# Patient Record
Sex: FEMALE | Race: OTHER | Employment: UNEMPLOYED | URBAN - METROPOLITAN AREA
[De-identification: names, ages, dates, MRNs, and addresses within clinical notes are randomized per-mention and may not be internally consistent; named-entity substitution may affect disease eponyms.]

---

## 2017-10-12 ENCOUNTER — HOSPITAL ENCOUNTER (EMERGENCY)
Age: 37
Discharge: HOME | End: 2017-10-12
Attending: EMERGENCY MEDICINE | Admitting: EMERGENCY MEDICINE
Payer: SUBSIDIZED

## 2017-10-12 ENCOUNTER — APPOINTMENT (OUTPATIENT)
Dept: GENERAL RADIOLOGY | Age: 37
End: 2017-10-12
Attending: PHYSICIAN ASSISTANT
Payer: SUBSIDIZED

## 2017-10-12 ENCOUNTER — HOSPITAL ENCOUNTER (EMERGENCY)
Age: 37
Discharge: HOME | End: 2017-10-13
Attending: EMERGENCY MEDICINE
Payer: SUBSIDIZED

## 2017-10-12 VITALS
BODY MASS INDEX: 21.69 KG/M2 | DIASTOLIC BLOOD PRESSURE: 82 MMHG | TEMPERATURE: 97.7 F | RESPIRATION RATE: 16 BRPM | WEIGHT: 122.4 LBS | HEART RATE: 65 BPM | OXYGEN SATURATION: 97 % | SYSTOLIC BLOOD PRESSURE: 125 MMHG | HEIGHT: 63 IN

## 2017-10-12 DIAGNOSIS — R07.89 ATYPICAL CHEST PAIN: Primary | ICD-10-CM

## 2017-10-12 DIAGNOSIS — M54.50 LOW BACK PAIN WITHOUT SCIATICA, UNSPECIFIED BACK PAIN LATERALITY, UNSPECIFIED CHRONICITY: Primary | ICD-10-CM

## 2017-10-12 DIAGNOSIS — N39.0 URINARY TRACT INFECTION WITHOUT HEMATURIA, SITE UNSPECIFIED: ICD-10-CM

## 2017-10-12 DIAGNOSIS — R09.1 PLEURISY: ICD-10-CM

## 2017-10-12 DIAGNOSIS — K59.00 CONSTIPATION, UNSPECIFIED CONSTIPATION TYPE: ICD-10-CM

## 2017-10-12 LAB
APPEARANCE UR: CLEAR
BACTERIA URNS QL MICRO: NEGATIVE /HPF
BILIRUB UR QL: NEGATIVE
COLOR UR: ABNORMAL
EPITH CASTS URNS QL MICRO: ABNORMAL /LPF
GLUCOSE UR STRIP.AUTO-MCNC: NEGATIVE MG/DL
HGB UR QL STRIP: NEGATIVE
KETONES UR QL STRIP.AUTO: NEGATIVE MG/DL
LEUKOCYTE ESTERASE UR QL STRIP.AUTO: ABNORMAL
MUCOUS THREADS URNS QL MICRO: ABNORMAL /LPF
NITRITE UR QL STRIP.AUTO: NEGATIVE
PH UR STRIP: 6 [PH] (ref 5–8)
PROT UR STRIP-MCNC: NEGATIVE MG/DL
RBC #/AREA URNS HPF: ABNORMAL /HPF (ref 0–5)
SP GR UR REFRACTOMETRY: 1.03 (ref 1–1.03)
UR CULT HOLD, URHOLD: NORMAL
UROBILINOGEN UR QL STRIP.AUTO: 1 EU/DL (ref 0.2–1)
WBC URNS QL MICRO: ABNORMAL /HPF (ref 0–4)

## 2017-10-12 PROCEDURE — 99284 EMERGENCY DEPT VISIT MOD MDM: CPT

## 2017-10-12 PROCEDURE — 82550 ASSAY OF CK (CPK): CPT | Performed by: EMERGENCY MEDICINE

## 2017-10-12 PROCEDURE — 36415 COLL VENOUS BLD VENIPUNCTURE: CPT | Performed by: EMERGENCY MEDICINE

## 2017-10-12 PROCEDURE — 74000 XR ABD (KUB): CPT

## 2017-10-12 PROCEDURE — 83690 ASSAY OF LIPASE: CPT | Performed by: EMERGENCY MEDICINE

## 2017-10-12 PROCEDURE — 74011250637 HC RX REV CODE- 250/637: Performed by: PHYSICIAN ASSISTANT

## 2017-10-12 PROCEDURE — 96361 HYDRATE IV INFUSION ADD-ON: CPT

## 2017-10-12 PROCEDURE — 85379 FIBRIN DEGRADATION QUANT: CPT | Performed by: EMERGENCY MEDICINE

## 2017-10-12 PROCEDURE — 72100 X-RAY EXAM L-S SPINE 2/3 VWS: CPT

## 2017-10-12 PROCEDURE — 96374 THER/PROPH/DIAG INJ IV PUSH: CPT

## 2017-10-12 PROCEDURE — 84484 ASSAY OF TROPONIN QUANT: CPT | Performed by: EMERGENCY MEDICINE

## 2017-10-12 PROCEDURE — 85025 COMPLETE CBC W/AUTO DIFF WBC: CPT | Performed by: EMERGENCY MEDICINE

## 2017-10-12 PROCEDURE — 81001 URINALYSIS AUTO W/SCOPE: CPT | Performed by: EMERGENCY MEDICINE

## 2017-10-12 PROCEDURE — 99283 EMERGENCY DEPT VISIT LOW MDM: CPT

## 2017-10-12 PROCEDURE — 80053 COMPREHEN METABOLIC PANEL: CPT | Performed by: EMERGENCY MEDICINE

## 2017-10-12 PROCEDURE — 93005 ELECTROCARDIOGRAM TRACING: CPT

## 2017-10-12 RX ORDER — CEPHALEXIN 500 MG/1
500 CAPSULE ORAL 2 TIMES DAILY
Qty: 14 CAP | Refills: 0 | Status: SHIPPED | OUTPATIENT
Start: 2017-10-12 | End: 2017-10-19

## 2017-10-12 RX ORDER — PHENAZOPYRIDINE HYDROCHLORIDE 200 MG/1
200 TABLET, FILM COATED ORAL 3 TIMES DAILY
Qty: 6 TAB | Refills: 0 | Status: SHIPPED | OUTPATIENT
Start: 2017-10-12 | End: 2017-10-14

## 2017-10-12 RX ORDER — IBUPROFEN 600 MG/1
600 TABLET ORAL
Qty: 20 TAB | Refills: 0 | OUTPATIENT
Start: 2017-10-12 | End: 2020-11-06

## 2017-10-12 RX ORDER — KETOROLAC TROMETHAMINE 30 MG/ML
30 INJECTION, SOLUTION INTRAMUSCULAR; INTRAVENOUS
Status: COMPLETED | OUTPATIENT
Start: 2017-10-12 | End: 2017-10-13

## 2017-10-12 RX ORDER — HYDROCODONE BITARTRATE AND ACETAMINOPHEN 5; 325 MG/1; MG/1
1 TABLET ORAL
Status: COMPLETED | OUTPATIENT
Start: 2017-10-12 | End: 2017-10-12

## 2017-10-12 RX ORDER — POLYETHYLENE GLYCOL 3350 17 G/17G
17 POWDER, FOR SOLUTION ORAL DAILY
Qty: 14 PACKET | Refills: 0 | Status: SHIPPED | OUTPATIENT
Start: 2017-10-12 | End: 2021-01-26 | Stop reason: ALTCHOICE

## 2017-10-12 RX ADMIN — HYDROCODONE BITARTRATE AND ACETAMINOPHEN 1 TABLET: 5; 325 TABLET ORAL at 18:24

## 2017-10-12 NOTE — DISCHARGE INSTRUCTIONS
Dolor de espalda: Instrucciones de cuidado - [ Back Pain: Care Instructions ]  Instrucciones de cuidado    El dolor de espalda tiene muchas causas posibles. Con frecuencia, está relacionado con problemas en los músculos y ligamentos de la espalda. También podría estar relacionado con problemas de los nervios, discos o huesos de la espalda. Moverse, levantar algo, ponerse de pie, sentarse o dormir de [place] incómoda pueden forzar la espalda. Algunas veces no se da cuenta de que tiene miryam lesión [employer] tarde. La artritis es otra causa común del dolor de espalda. Aunque dominique vez duela mucho, el dolor de espalda por lo general mejora por sí mismo en varias semanas. [address] personas se recuperan en 12 semanas o menos. Hacer un buen tratamiento en el hogar y tener cuidado de no forzar la espalda puede ayudar a que se sienta mejor más pronto. La atención de seguimiento es miryam parte clave de rowell tratamiento y seguridad. Asegúrese de hacer y acudir a todas las citas, y llame a rowell médico si está teniendo problemas. También es miryam buena idea saber los resultados de los exámenes y mantener miryam lista de los medicamentos que paresh. ¿Cómo puede cuidarse en el hogar? · Siéntese o acuéstese en las posiciones más cómodas y que reduzcan el dolor. Trate miryam de estas posiciones al acostarse:  ¨ Acuéstese boca arriba con las rodillas dobladas y apoyadas sobre almohadones grandes. ¨ Acuéstese en el piso con las piernas sobre el asiento de un sofá o miryam silla. ¨ Acuéstese de lado con las rodillas y caderas dobladas y [place] entre las piernas. ¨ Acuéstese boca abajo siempre que esta posición no empeore el dolor. · No se siente en la cama y evite los sofás blandos y las posiciones torcidas. El reposo en cama puede aliviar el dolor al principio, dimitris retrasa la sanación. Evite reposar en la cama después del primer día de dolor de espalda. · Cambie de posición cada 30 minutos.  Si debe sentarse por [employer], párese y descanse de estar sentado. Levántese y camine, o acuéstese en miryam posición cómoda. · Pruebe usar miryam almohadilla térmica a temperatura baja o mediana minerva 15 a 20 minutos cada 2 ó 3 horas. Pruebe miryam ducha tibia en vez de miryam sesión con la almohadilla térmica. · También puede probar miryam compresa de hielo minerva 10 a 15 minutos cada 2 a 3 horas. Póngase un paño soto entre la compresa de hielo y la piel. · [employer] analgésicos (medicamentos para el dolor) exactamente según las indicaciones. ¨ Si el médico le recetó un analgésico, tómelo según las indicaciones. ¨ Si no está tomando un analgésico recetado, pregúntele a rowell médico si puede rosalia thania de [employer]. · Aisha caminatas cortas varias veces al día. Usted puede comenzar con 5 a 10 minutos, 3 ó 4 veces al día, y aumentar progresivamente hasta lograr caminatas más largas. Camine en superficies yuan y evite colinas y escaleras hasta que la espalda esté mejor. · Regrese al Elois  y otras actividades lo más pronto posible. El descanso continuo sin actividad por lo general no es leary para la espalda. · Para prevenir el dolor de espalda en el futuro, aisha ejercicios para estirar y fortalecer la espalda y el abdomen. Aprenda a Time Jansen, técnicas seguras para levantar peso y la mecánica corporal apropiada. ¿Cuándo debe pedir ayuda? Llame a rowell médico ahora mismo o busque atención médica inmediata si:  · Tiene un nuevo entumecimiento en las piernas o éste empeora. · Tiene un nuevo debilitamiento en [place]. ([place] puede hacer que sea difícil ponerse de pie). · Pierde el control de la vejiga o los intestinos. Preste especial atención a los cambios en rowell natividad y asegúrese de comunicarse con rowell médico si:  · El dolor Stephanie [place]. · No está mejorando después de 2 semanas. ¿Dónde puede encontrar más información en inglés? Dede Cast a http://mayo-lukas.info/.   Escriba U259 en la búsqueda para aprender Blanca Gilliland de \"Dolor de espalda: Instrucciones de cuidado - [ Back Pain: Care Instructions ]. \"  Revisado: 21 marzo, 2017  Versión del contenido: 11.3  © 6726-5264 Healthwise, Incorporated. Las instrucciones de cuidado fueron adaptadas bajo licencia por Good Help Connections (which disclaims liability or warranty for this information). Si usted tiene [place] afección médica o sobre estas instrucciones, siempre pregunte a rowell profesional de natividad. Healthwise, Incorporated niega toda garantía o responsabilidad por rowell uso de esta información. Estreñimiento: Instrucciones de cuidado - [ Constipation: Care Instructions ]  Instrucciones de cuidado  Tener estreñimiento significa que usted tiene dificultades para eliminar las heces (evacuaciones del intestino). Las personas eliminan heces entre 3 veces al día y Anderson Huger vez cada 3 días. Lo que es normal para usted puede ser [employer]. El estreñimiento puede ocurrir con dolor en el recto y cólicos. El dolor podría empeorar cuando trata de eliminar las heces. A veces hay pequeñas cantidades de angie paulino viva en el papel higiénico o en la superficie de las heces. Amaya se debe a las venas dilatadas cerca del recto (hemorroides). Algunos cambios en rowell [place] y estilo de jimmy podrían ayudarle a evitar el estreñimiento continuo. Es posible que el médico además le recete medicamentos para ayudar a aflojar las heces. Algunos medicamentos pueden causar estreñimiento. Amaya incluye los analgésicos (medicamentos para el dolor) y los antidepresivos. Infórmele a rowell médico sobre Jose Richards que usted paresh. Es posible que rowell médico quiera cambiar un medicamento para aliviar alberto síntomas. La atención de seguimiento es miryam parte clave de rowell tratamiento y seguridad. Asegúrese de hacer y acudir a todas las citas, y llame a rowell médico si está teniendo problemas.  También es miryam buena idea saber los resultados de los exámenes y mantener miryam lista de [place] medicamentos que paresh. ¿Cómo puede cuidarse en el hogar? · Cadence abundantes líquidos, los suficientes ascencion para que rowell orina sea de color amarillo artie o transparente ascencion el agua. Si tiene [employer], del corazón o del hígado y tiene que Tyrone's líquidos, hable con rowell médico antes de aumentar rowell consumo. · Incluya en rowell dieta diaria alimentos ricos en fibra. Estos incluyen frutas, verduras, frijoles (habichuelas) y granos integrales. · [place] por lo menos 30 minutos de ejercicio la mayoría de los días de la [place]. Caminar es miryam buena opción. Es posible que también quiera hacer otras actividades, ascencion correr, nadar, [employer], o jugar al tenis u otros deportes de equipo. · [place] un suplemento de Deandra, ascencion Cit[employer] o Metamucil, todos los GRASSE. Vera y siga todas las indicaciones de la [place]. · Programe tiempo todos los días para evacuar el intestino. Daljit Inoue rutina diaria podría ayudar. Tómese rowell tiempo para evacuar el intestino. · Apoye los pies sobre un banco o taburete pequeño cuando se siente en el inodoro. [place] ayuda a flexionar las caderas y coloca la pelvis en posición de cuclillas. · Rowell médico podría recomendarle un laxante de venta aristeo para aliviar el estreñimiento. Fay Bar son Kalen Bird de Magnesia (Milk of Magnesia) y Romance. Vera y siga todas las instrucciones de la [place]. No use laxantes de [employer]. ¿Cuándo debe pedir ayuda? Llame a rowell médico ahora mismo o busque atención médica inmediata si:  · Tiene dolor abdominal nuevo o peor. · Tiene náuseas o vómito nuevos o peores. · Tiene angie en las heces. Preste especial atención a los cambios en rowell natividad y asegúrese de comunicarse con rowell médico si:  · Rowell estreñimiento empeora. · No mejora ascencion se esperaba. ¿Dónde puede encontrar más información en inglés? Brenda Wagoner a http://mayo-lukas.info/.   Escriba P343 en la búsqueda para aprender Ludger Shells de \"Estreñimiento: Instrucciones de cuidado - [ Constipation: Care Instructions ]. \"  Revisado: 20 marzo, 2017  Versión del contenido: 11.3  © 4764-4857 Healthwise, Incorporated. Las instrucciones de cuidado fueron adaptadas bajo licencia por Good Help Connections (which disclaims liability or warranty for this information). Si usted tiene [place] afección médica o sobre estas instrucciones, siempre pregunte a rowell profesional de natividad. Healthwise, Incorporated niega toda garantía o responsabilidad por rowell uso de esta información. Infección urinaria en las mujeres: Instrucciones de cuidado - [ Urinary Tract Infection in Women: Care Instructions ]  Instrucciones de cuidado    Miryam infección urinaria (UTI, por alberto siglas en inglés) es un término general que hace referencia a miryam infección que se produce en cualquier parte entre los riñones y la uretra (conducto por el cual se expulsa la orina). La mayoría de las UTI son infecciones de la vejiga. Con frecuencia, causan dolor o ardor al FrankiIrene. Las UTI son causadas por bacterias y pueden curarse con antibióticos. Asegúrese de completar el tratamiento para que la infección desaparezca. La atención de seguimiento es miryam parte clave de rowell tratamiento y seguridad. Asegúrese de hacer y acudir a todas las citas, y llame a rowell médico si está teniendo problemas. También es miryam buena idea saber los resultados de alberto exámenes y mantener miryam lista de los medicamentos que paresh. ¿Cómo puede cuidarse en el hogar? · [address] No deje de tomarlos por el hecho de sentirse mejor. Debe rosalia todos los antibióticos hasta terminarlos. · [place] los próximos thania o [address], flavio mayor cantidad de [place] y otros líquidos. [place] puede ayudar a eliminar las bacterias que provocan la infección. (Si tiene miryam enfermedad de los riñones, el corazón o el hígado y tiene que [place]'s líquidos, hable con rowell médico antes de aumentar rowell consumo).   · [address] gaseosas o con cafeína. Pueden irritar la vejiga. · Orine con frecuencia. Trate de vaciar la vejiga cada vez que orine. · Para aliviar el dolor, tome un baño caliente o colóquese miryam almohadilla térmica a baja temperatura sobre la parte baja del abdomen o la tutu genital. Nunca se duerma mientras [place] miryam almohadilla térmica. Para prevenir las infecciones urinarias  · Cadence abundante agua todos los días. [place] la ayuda a orinar con frecuencia, lo que elimina las bacterias de rowell organismo. (Si tiene miryam enfermedad de los riñones, el corazón o el hígado y tiene que Rajendra's líquidos, hable con rowell médico antes de aumentar rowell consumo). · Orine cuando necesite hacerlo. · Orine inmediatamente después de mauricio tenido [employer]. · Cámbiese las toallas sanitarias con frecuencia. · Evite el uso de lavados vaginales, los leonidas de burbujas, los [place] de higiene femenina y otros productos para la higiene femenina que contengan desodorantes. · Después de ir al baño, límpiese de adelante hacia atrás. ¿Cuándo debe pedir ayuda? Llame a rowell médico ahora mismo o busque atención médica inmediata si:  · Aparecen síntomas ascencion fiebre, escalofríos, náuseas o vómito por Radha Cabrera, o empeoran. · Tiene un nuevo dolor de espalda tonja debajo de las [place]. [place] se llama dolor en el flanco.  · Tiene angie o pus nuevos en la orina. · Tiene problemas con rowell antibiótico.  Preste especial atención a los cambios en rowell natividad y asegúrese de comunicarse con rowell médico si:  · No mejora después de mauricio tomado un antibiótico minerva 2 días. · Marry síntomas desaparecen y [employer]. ¿Dónde puede encontrar más información en inglés? Briana Valadez a http://mayo-lukas.info/. Delbert Rubio T979 en la búsqueda para aprender más acerca de \"Infección urinaria en las mujeres: Instrucciones de cuidado - [ Urinary Tract Infection in Women: Care Instructions ]. \"  Revisado: 28 noviembre, 2016  Versión del contenido: 11.3  © 1535-8590 Healthwise, [employer]ite.[employer]. Las instrucciones de cuidado fueron adaptadas bajo licencia por [facility] Connections (which disclaims liability or warranty for this information). Si usted tiene [place] afección médica o sobre estas instrucciones, siempre pregunte a rowell profesional de natividad. [place] [employer], [employer] niega toda garantía o responsabilidad por rowell uso de esta información.

## 2017-10-12 NOTE — ED TRIAGE NOTES
TRIAGE NOTE: Interpretor # [id number] \"My lower back hurts a lot. \" Symptoms started Saturday, stating that the pain goes from her lower back all the way up to her neck.  Denies injury, also reports dysuria and pain with defecation

## 2017-10-12 NOTE — ED PROVIDER NOTES
HPI Comments: 29 y/o  female presents to the ED for the evaluation of multiple medical complaints. According to patient, her symptoms started on Saturday. She said she started with some lower back discomfort. Worse with movement. Occasionally pain radiates down towards leg. She states it is worse with movement. She cannot recall any injury. She Is also c/o dysuria and urinary frequency. No vaginal discharge/bleeding. No saddle anesthesia noted. No bowel/bladder incontinence or urinary retention noted. She also noted feeling some constipation and pain with bowel movements due to it coming out hard. She denies any abdominal pain. No fevers/chills. No nausea/vomiting. No blood/mucous noted in stool. No other acute medical complaints expressed at this time. The history is provided by the patient. No  was used. History reviewed. No pertinent past medical history. History reviewed. No pertinent surgical history. History reviewed. No pertinent family history. Social History     Social History    Marital status: SINGLE     Spouse name: N/A    Number of children: N/A    Years of education: N/A     Occupational History    Not on file. Social History Main Topics    Smoking status: Never Smoker    Smokeless tobacco: Not on file    Alcohol use No    Drug use: Not on file    Sexual activity: Not on file     Other Topics Concern    Not on file     Social History Narrative         ALLERGIES: Review of patient's allergies indicates no known allergies. Review of Systems   Constitutional: Negative for chills and fever. HENT: Negative for ear pain, facial swelling, hearing loss, sore throat and trouble swallowing. Eyes: Negative. Respiratory: Negative for cough, chest tightness, shortness of breath and wheezing. Cardiovascular: Negative for chest pain. Gastrointestinal: Positive for constipation.  Negative for abdominal pain, diarrhea, nausea and vomiting. Genitourinary: Positive for dysuria. Negative for flank pain, frequency, hematuria and urgency. Musculoskeletal: Positive for back pain. Negative for neck pain and neck stiffness. Skin: Negative. Negative for rash and wound. Neurological: Negative for dizziness, weakness, light-headedness, numbness and headaches. Hematological: Does not bruise/bleed easily. Psychiatric/Behavioral: Negative. All other systems reviewed and are negative. Vitals:    10/12/17 1646   BP: 125/82   Pulse: 65   Resp: 16   Temp: 97.7 °F (36.5 °C)   SpO2: 97%   Weight: 55.5 kg (122 lb 6.4 oz)   Height: 5' 3\" (1.6 m)            Physical Exam   Constitutional: She is oriented to person, place, and time. She appears well-developed and well-nourished. No distress. HENT:   Head: Normocephalic and atraumatic. Eyes: Conjunctivae and EOM are normal. Pupils are equal, round, and reactive to light. Neck: Normal range of motion. Neck supple. No midline tenderness to palpation of cspine. Cardiovascular: Normal rate, regular rhythm, normal heart sounds and intact distal pulses. Exam reveals no gallop and no friction rub. No murmur heard. Pulmonary/Chest: Effort normal and breath sounds normal. No respiratory distress. She has no wheezes. She has no rales. She exhibits no tenderness. Abdominal: Soft. Bowel sounds are normal. She exhibits no distension and no mass. There is no tenderness. There is no rebound and no guarding. No aortic bruits,  No femoral bruits. 2+ femoral pulses     Musculoskeletal: Normal range of motion. She exhibits no edema or tenderness. No TS spine pain with palpation. Mild Lspine pain with palpation. No stepoffs, no deformity  No redness, rashes, warmth, or cellulitis. 5/5 flexion/extension of hips bilaterally  5/5 great toes strength bilaterally  5/5 dorsiflexion/plantarflexion bilaterally  Straight leg raise negative. No saddle anesthesia present.   Walks on heels/toes. Neurological: She is alert and oriented to person, place, and time. She has normal reflexes. No cranial nerve deficit. She exhibits normal muscle tone. Coordination normal.   Skin: Skin is warm and dry. No rash noted. No erythema. Psychiatric: She has a normal mood and affect. Her behavior is normal. Judgment and thought content normal.   Nursing note and vitals reviewed.        MDM  ED Course       Procedures    29 y/o female with low back pain, suspect msk in nature as tender to palpation  Will d/c home with motrin and recommend heating pad  Patient also with + uti so will cover with keflex and pyridium  Patient also with likely constipation so will recommend daily miralax   Patient verbalizes understanding of dx and is aware of what s/sx to monitor that would warrant return visit to ED  Discussed with Dr. Kunal Romero

## 2017-10-13 ENCOUNTER — APPOINTMENT (OUTPATIENT)
Dept: GENERAL RADIOLOGY | Age: 37
End: 2017-10-13
Attending: EMERGENCY MEDICINE
Payer: SUBSIDIZED

## 2017-10-13 VITALS
DIASTOLIC BLOOD PRESSURE: 66 MMHG | WEIGHT: 121.69 LBS | RESPIRATION RATE: 16 BRPM | SYSTOLIC BLOOD PRESSURE: 106 MMHG | OXYGEN SATURATION: 99 % | HEIGHT: 63 IN | TEMPERATURE: 97.8 F | HEART RATE: 68 BPM | BODY MASS INDEX: 21.56 KG/M2

## 2017-10-13 LAB
ALBUMIN SERPL-MCNC: 3.8 G/DL (ref 3.5–5)
ALBUMIN/GLOB SERPL: 0.9 {RATIO} (ref 1.1–2.2)
ALP SERPL-CCNC: 85 U/L (ref 45–117)
ALT SERPL-CCNC: 40 U/L (ref 12–78)
ANION GAP BLD CALC-SCNC: 16 MMOL/L (ref 5–15)
ANION GAP SERPL CALC-SCNC: 9 MMOL/L (ref 5–15)
AST SERPL-CCNC: 64 U/L (ref 15–37)
BASOPHILS # BLD: 0 K/UL (ref 0–0.1)
BASOPHILS NFR BLD: 0 % (ref 0–1)
BILIRUB SERPL-MCNC: 0.7 MG/DL (ref 0.2–1)
BUN BLD-MCNC: 13 MG/DL (ref 9–20)
BUN SERPL-MCNC: 16 MG/DL (ref 6–20)
BUN/CREAT SERPL: 24 (ref 12–20)
CA-I BLD-MCNC: 1.2 MMOL/L (ref 1.12–1.32)
CALCIUM SERPL-MCNC: 9.3 MG/DL (ref 8.5–10.1)
CHLORIDE BLD-SCNC: 107 MMOL/L (ref 98–107)
CHLORIDE SERPL-SCNC: 105 MMOL/L (ref 97–108)
CK MB CFR SERPL CALC: NORMAL % (ref 0–2.5)
CK MB SERPL-MCNC: <1 NG/ML (ref 5–25)
CK SERPL-CCNC: 166 U/L (ref 26–192)
CO2 BLD-SCNC: 24 MMOL/L (ref 21–32)
CO2 SERPL-SCNC: 23 MMOL/L (ref 21–32)
CREAT BLD-MCNC: 0.5 MG/DL (ref 0.6–1.3)
CREAT SERPL-MCNC: 0.66 MG/DL (ref 0.55–1.02)
D DIMER PPP FEU-MCNC: 0.17 MG/L FEU (ref 0–0.65)
EOSINOPHIL # BLD: 0 K/UL (ref 0–0.4)
EOSINOPHIL NFR BLD: 0 % (ref 0–7)
ERYTHROCYTE [DISTWIDTH] IN BLOOD BY AUTOMATED COUNT: 13 % (ref 11.5–14.5)
GLOBULIN SER CALC-MCNC: 4.4 G/DL (ref 2–4)
GLUCOSE BLD-MCNC: 142 MG/DL (ref 65–100)
GLUCOSE SERPL-MCNC: 113 MG/DL (ref 65–100)
HCT VFR BLD AUTO: 39.3 % (ref 35–47)
HCT VFR BLD CALC: 38 % (ref 35–47)
HGB BLD-MCNC: 12.9 GM/DL (ref 11.5–16)
HGB BLD-MCNC: 13.2 G/DL (ref 11.5–16)
LIPASE SERPL-CCNC: 127 U/L (ref 73–393)
LYMPHOCYTES # BLD: 1.4 K/UL (ref 0.8–3.5)
LYMPHOCYTES NFR BLD: 10 % (ref 12–49)
MCH RBC QN AUTO: 29.9 PG (ref 26–34)
MCHC RBC AUTO-ENTMCNC: 33.6 G/DL (ref 30–36.5)
MCV RBC AUTO: 88.9 FL (ref 80–99)
MONOCYTES # BLD: 0.9 K/UL (ref 0–1)
MONOCYTES NFR BLD: 6 % (ref 5–13)
NEUTS SEG # BLD: 12.2 K/UL (ref 1.8–8)
NEUTS SEG NFR BLD: 84 % (ref 32–75)
PLATELET # BLD AUTO: 279 K/UL (ref 150–400)
POTASSIUM BLD-SCNC: 3.8 MMOL/L (ref 3.5–5.1)
POTASSIUM SERPL-SCNC: 5.6 MMOL/L (ref 3.5–5.1)
PROT SERPL-MCNC: 8.2 G/DL (ref 6.4–8.2)
RBC # BLD AUTO: 4.42 M/UL (ref 3.8–5.2)
SERVICE CMNT-IMP: ABNORMAL
SODIUM BLD-SCNC: 143 MMOL/L (ref 136–145)
SODIUM SERPL-SCNC: 137 MMOL/L (ref 136–145)
TROPONIN I SERPL-MCNC: <0.04 NG/ML
WBC # BLD AUTO: 14.6 K/UL (ref 3.6–11)

## 2017-10-13 PROCEDURE — 71020 XR CHEST PA LAT: CPT

## 2017-10-13 PROCEDURE — 74011250636 HC RX REV CODE- 250/636: Performed by: EMERGENCY MEDICINE

## 2017-10-13 PROCEDURE — 80047 BASIC METABLC PNL IONIZED CA: CPT

## 2017-10-13 RX ORDER — HYDROCODONE BITARTRATE AND ACETAMINOPHEN 5; 325 MG/1; MG/1
1 TABLET ORAL
Qty: 20 TAB | Refills: 0 | Status: SHIPPED | OUTPATIENT
Start: 2017-10-13 | End: 2021-01-26 | Stop reason: ALTCHOICE

## 2017-10-13 RX ADMIN — KETOROLAC TROMETHAMINE 30 MG: 30 INJECTION, SOLUTION INTRAMUSCULAR at 00:11

## 2017-10-13 RX ADMIN — SODIUM CHLORIDE 1000 ML: 900 INJECTION, SOLUTION INTRAVENOUS at 00:12

## 2017-10-13 NOTE — ED PROVIDER NOTES
HPI Comments: 30 y.o. female with no significant past medical history who presents from home with chief complaint of chest pain. Pt reports an acute onset of substernal chest pain x shortly prior to arrival. The pain has been constant, radiating to epigastric region. Pt provides paperwork from ED visit earlier in the day for back pain (mid to low). She states that she is still having this pain as well. Pt denies nausea, vomiting, diarrhea, constipation, headache, arm pain or leg pain. There are no other acute medical concerns at this time. Pt is currently breast feeding. Chart review: Pt evaluated in ED 6.5 hours ago for Low back pain without sciatica, UTI and constipation. Pt discharged home on Keflex, Ibuprofen, Pyridum and Miralax. PCP: None    Note written by Tricia Mena, as dictated by Sarwat Mckay MD 12:10 AM    The history is provided by the patient. No  was used. History reviewed. No pertinent past medical history. History reviewed. No pertinent surgical history. History reviewed. No pertinent family history. Social History     Social History    Marital status: SINGLE     Spouse name: N/A    Number of children: N/A    Years of education: N/A     Occupational History    Not on file. Social History Main Topics    Smoking status: Never Smoker    Smokeless tobacco: Not on file    Alcohol use No    Drug use: Not on file    Sexual activity: Not on file     Other Topics Concern    Not on file     Social History Narrative         ALLERGIES: Review of patient's allergies indicates no known allergies. Review of Systems   Cardiovascular: Positive for chest pain. Negative for leg swelling. Gastrointestinal: Positive for abdominal pain (epigastric). Negative for constipation, diarrhea, nausea and vomiting. Musculoskeletal: Positive for back pain. Negative for neck pain. Skin: Negative for rash. Neurological: Negative for headaches. All other systems reviewed and are negative. Vitals:    10/12/17 2308   BP: 129/86   Pulse: (!) 59   Resp: 16   Temp: 97.5 °F (36.4 °C)   SpO2: 99%   Weight: 55.2 kg (121 lb 11.1 oz)   Height: 5' 3\" (1.6 m)            Physical Exam   Nursing note and vitals reviewed. CONSTITUTIONAL: Well-appearing; well-nourished; in no apparent distress  HEAD: Normocephalic; atraumatic  EYES: PERRL; EOM intact; conjunctiva and sclera are clear bilaterally. ENT: No rhinorrhea; normal pharynx with no tonsillar hypertrophy; mucous membranes pink/moist, no erythema, no exudate. NECK: Supple; non-tender; no cervical lymphadenopathy  CARD: Normal S1, S2; no murmurs, rubs, or gallops. Regular rate and rhythm. RESP: Normal respiratory effort; breath sounds clear and equal bilaterally; no wheezes, rhonchi, or rales. Mid-sternal chest wall tenderness on palpation and movement of torso and both arms  ABD: Normal bowel sounds; non-distended; non-tender; no palpable organomegaly, no masses, no bruits. Back Exam: Normal inspection; no vertebral point tenderness, no CVA tenderness. Normal range of motion. EXT: Normal ROM in all four extremities; non-tender to palpation; no swelling or deformity; distal pulses are normal, no edema. SKIN: Warm; dry; no rash. NEURO:Alert and oriented x 3, coherent, JOHN-XII grossly intact, sensory and motor are non-focal.        MDM  Number of Diagnoses or Management Options  Atypical chest pain:   Pleurisy:   Diagnosis management comments: Assessment: 30 year female with pleuritic chest pain that is palpable reducible. On exam. Differential diagnosis includes ACS,VTE, pleurisy, pneumonia, pneumothorax, GERD, and myofascial strain. Plan: EKG/ chest x-ray/ lab/ Toradol/ serial exam/ Monitor and Reevaluate.          Amount and/or Complexity of Data Reviewed  Clinical lab tests: ordered and reviewed  Tests in the radiology section of CPT®: ordered and reviewed  Tests in the medicine section of CPT®: reviewed and ordered  Discussion of test results with the performing providers: yes  Decide to obtain previous medical records or to obtain history from someone other than the patient: yes  Obtain history from someone other than the patient: yes  Review and summarize past medical records: yes  Discuss the patient with other providers: yes  Independent visualization of images, tracings, or specimens: yes    Risk of Complications, Morbidity, and/or Mortality  Presenting problems: moderate  Diagnostic procedures: moderate  Management options: moderate      ED Course       Procedures     ED EKG interpretation:  Rhythm: sinus bradycardia; and regular . Rate (approx.): 59; Axis: normal; P wave: normal; QRS interval: normal ; ST/T wave: normal; in  Lead: Diffusely; Other findings: abnormal ekg. This EKG was interpreted by Gama Palomino MD,ED Provider. XRAY INTERPRETATION (ED MD)  Chest Xray  No acute process seen. Normal heart size. No bony abnormalities. No infiltrate. Gama Palomino MD 11:27 PM    Progress Note:   Pt has been reexamined by Gama Palomino MD. Pt is feeling much better. Symptoms have improved. All available results have been reviewed with pt and any available family. Spell he has potassium of 5.6, was repeated and appears not to be within normal limitsPt understands sx, dx, and tx in ED. Care plan has been outlined and questions have been answered. Pt is ready to go home. Will send home on chest pain, and pleurisy instructions. prescription of hydrocodone. Outpatient referral with PCP as needed. Written by Gama Palomino MD,6:17 AM    .   .

## 2017-10-13 NOTE — DISCHARGE INSTRUCTIONS
We hope that we have addressed all of your medical concerns. The examination and treatment you received in the Emergency Department were for an emergent problem and were not intended as complete care. It is important that you follow up with your healthcare provider(s) for ongoing care. If your symptoms worsen or do not improve as expected, and you are unable to reach your usual health care provider(s), you should return to the Emergency Department. Today's healthcare is undergoing tremendous change, and patient satisfaction surveys are one of the many tools to assess the quality of medical care. You may receive a survey from the [employer] organization regarding your experience in the Emergency Department. I hope that your experience has been completely positive, particularly the medical care that I provided. As such, please participate in the survey; anything less than excellent does not meet my expectations or intentions. [facility] and [facility] participate in nationally recognized quality of care measures. If your blood pressure is greater than 120/80, as reported below, we urge that you seek medical care to address the potential of high blood pressure, commonly known as hypertension. Hypertension can be hereditary or can be caused by certain medical conditions, pain, stress, or \"white coat syndrome. \"       Please make an appointment with your health care provider(s) for follow up of your Emergency Department visit. VITALS:   Patient Vitals for the past 8 hrs:   Temp Pulse Resp BP SpO2   10/13/17 0100 - - - 104/62 100 %   10/12/17 2308 97.5 °F (36.4 °C) (!) 59 16 129/86 99 %          Thank you for allowing us to provide you with medical care today. We realize that you have many choices for your emergency care needs. Please choose us in the future for any continued health care needs. Fernando Rodriguez, [address] Emergency [facility].   Office: [phone]            Recent Results (from the past 24 hour(s))   URINALYSIS W/ RFLX MICROSCOPIC    Collection Time: 10/12/17  6:02 PM   Result Value Ref Range    Color YELLOW/STRAW      Appearance CLEAR CLEAR      Specific gravity 1.029 1.003 - 1.030      pH (UA) 6.0 5.0 - 8.0      Protein NEGATIVE  NEG mg/dL    Glucose NEGATIVE  NEG mg/dL    Ketone NEGATIVE  NEG mg/dL    Bilirubin NEGATIVE  NEG      Blood NEGATIVE  NEG      Urobilinogen 1.0 0.2 - 1.0 EU/dL    Nitrites NEGATIVE  NEG      Leukocyte Esterase SMALL (A) NEG      WBC 5-10 0 - 4 /hpf    RBC 0-5 0 - 5 /hpf    Epithelial cells FEW FEW /lpf    Bacteria NEGATIVE  NEG /hpf    Mucus 2+ (A) NEG /lpf   URINE CULTURE HOLD SAMPLE    Collection Time: 10/12/17  6:02 PM   Result Value Ref Range    Urine culture hold URINE ON HOLD IN MICROBIOLOGY DEPT FOR 3 DAYS     EKG, 12 LEAD, INITIAL    Collection Time: 10/12/17 11:12 PM   Result Value Ref Range    Ventricular Rate 59 BPM    Atrial Rate 59 BPM    P-R Interval 144 ms    QRS Duration 84 ms    Q-T Interval 438 ms    QTC Calculation (Bezet) 433 ms    Calculated P Axis 46 degrees    Calculated R Axis 27 degrees    Calculated T Axis 39 degrees    Diagnosis Sinus bradycardia  No previous ECGs available      CBC WITH AUTOMATED DIFF    Collection Time: 10/12/17 11:55 PM   Result Value Ref Range    WBC 14.6 (H) 3.6 - 11.0 K/uL    RBC 4.42 3.80 - 5.20 M/uL    HGB 13.2 11.5 - 16.0 g/dL    HCT 39.3 35.0 - 47.0 %    MCV 88.9 80.0 - 99.0 FL    MCH 29.9 26.0 - 34.0 PG    MCHC 33.6 30.0 - 36.5 g/dL    RDW 13.0 11.5 - 14.5 %    PLATELET 984 200 - 392 K/uL    NEUTROPHILS 84 (H) 32 - 75 %    LYMPHOCYTES 10 (L) 12 - 49 %    MONOCYTES 6 5 - 13 %    EOSINOPHILS 0 0 - 7 %    BASOPHILS 0 0 - 1 %    ABS. NEUTROPHILS 12.2 (H) 1.8 - 8.0 K/UL    ABS. LYMPHOCYTES 1.4 0.8 - 3.5 K/UL    ABS. MONOCYTES 0.9 0.0 - 1.0 K/UL    ABS. EOSINOPHILS 0.0 0.0 - 0.4 K/UL    ABS.  BASOPHILS 0.0 0.0 - 0.1 K/UL   CK W/ CKMB & INDEX    Collection Time: 10/12/17 11:55 PM   Result Value Ref Range     26 - 192 U/L    CK - MB <1.0 <3.6 NG/ML    CK-MB Index Cannot be calculated 0 - 2.5     TROPONIN I    Collection Time: 10/12/17 11:55 PM   Result Value Ref Range    Troponin-I, Qt. <0.04 <3.29 ng/mL   METABOLIC PANEL, COMPREHENSIVE    Collection Time: 10/12/17 11:55 PM   Result Value Ref Range    Sodium 137 136 - 145 mmol/L    Potassium 5.6 (H) 3.5 - 5.1 mmol/L    Chloride 105 97 - 108 mmol/L    CO2 23 21 - 32 mmol/L    Anion gap 9 5 - 15 mmol/L    Glucose 113 (H) 65 - 100 mg/dL    BUN 16 6 - 20 MG/DL    Creatinine 0.66 0.55 - 1.02 MG/DL    BUN/Creatinine ratio 24 (H) 12 - 20      GFR est AA >60 >60 ml/min/1.73m2    GFR est non-AA >60 >60 ml/min/1.73m2    Calcium 9.3 8.5 - 10.1 MG/DL    Bilirubin, total 0.7 0.2 - 1.0 MG/DL    ALT (SGPT) 40 12 - 78 U/L    AST (SGOT) 64 (H) 15 - 37 U/L    Alk. phosphatase 85 45 - 117 U/L    Protein, total 8.2 6.4 - 8.2 g/dL    Albumin 3.8 3.5 - 5.0 g/dL    Globulin 4.4 (H) 2.0 - 4.0 g/dL    A-G Ratio 0.9 (L) 1.1 - 2.2     D DIMER    Collection Time: 10/12/17 11:55 PM   Result Value Ref Range    D-dimer 0.17 0.00 - 0.65 mg/L FEU   LIPASE    Collection Time: 10/12/17 11:55 PM   Result Value Ref Range    Lipase 127 73 - 393 U/L   POC CHEM8    Collection Time: 10/13/17  2:09 AM   Result Value Ref Range    Calcium, ionized (POC) 1.20 1. 12 - 1.32 MMOL/L    Sodium (POC) 143 136 - 145 MMOL/L    Potassium (POC) 3.8 3.5 - 5.1 MMOL/L    Chloride (POC) 107 98 - 107 MMOL/L    CO2 (POC) 24 21 - 32 MMOL/L    Anion gap (POC) 16 (H) 5 - 15 mmol/L    Glucose (POC) 142 (H) 65 - 100 MG/DL    BUN (POC) 13 9 - 20 MG/DL    Creatinine (POC) 0.5 (L) 0.6 - 1.3 MG/DL    GFRAA, POC >60 >60 ml/min/1.73m2    GFRNA, POC >60 >60 ml/min/1.73m2    Hemoglobin (POC) 12.9 11.5 - 16.0 GM/DL    Hematocrit (POC) 38 35.0 - 47.0 %    Comment Comment Not Indicated.          Xr Chest Pa Lat    Result Date: 10/13/2017  CLINICAL HISTORY: Chest pain INDICATION: Chest pain COMPARISON: None FINDINGS: PA and lateral views of the chest are obtained. The cardiopericardial silhouette is within normal limits. There is no pleural effusion, pneumothorax or focal consolidation present. IMPRESSION: No acute intrathoracic disease. Xr Spine Lumb 2 Or 3 V    Result Date: 10/12/2017  INDICATION:  Back Pain Exam: AP, lateral and cone-down views of the lumbar spine. FINDINGS: There is no acute fracture or subluxation. Intervertebral disc spaces are well maintained. Bones are well-mineralized. Soft tissues are normal.     IMPRESSION: No acute fracture or subluxation. Xr Abd (kub)    Result Date: 10/12/2017  INDICATION: Abdominal pain FINDINGS: Single supine view of the abdomen demonstrates a nonspecific intestinal gas pattern. There is a moderate amount of stool throughout the colon. Surgical clips are present in the right upper quadrant. Intrauterine device overlies the pelvis. No soft tissue mass or pathological soft tissue calcification is seen. The osseous structures are unremarkable. IMPRESSION: Nonspecific intestinal gas pattern. Moderate amount of stool throughout the colon. Pleuritis: Instrucciones de cuidado - [ Pleurisy: Care Instructions ]  Instrucciones de cuidado  La pleuritis es miryam inflamación de los tejidos que recubren la parte interna de las tim torácicas (del pecho) y los pulmones (pleura). Con frecuencia, la pleuritis es causada por miryam infección, generalmente un virus. También puede ser causada por otros problemas de natividad, ascencion neumonía o lupus. La pleuritis puede causar dolor shantelle en el pecho que empeora cuando usted tose o respira profundamente. Podría necesitar más pruebas para determinar qué está causando la pleuritis. El tratamiento depende de la causa. La pleuritis podría aparecer y desaparecer minerva algunos días, o podría permanecer si la causa no ha sido tratada.  El tratamiento en el hogar puede ayudarle a aliviar los síntomas. La atención de seguimiento es miryam parte clave de rowell tratamiento y seguridad. Asegúrese de hacer y acudir a todas las citas, y llame a rowell médico si está teniendo problemas. También es miryam buena idea saber los resultados de los exámenes y mantener miryam lista de los medicamentos que paresh. ¿Cómo puede cuidarse en el hogar? · Rotan un analgésico (medicamento para el dolor) de venta aristeo, ascencion acetaminofén (Tylenol), ibuprofeno (Advil, Motrin) o naproxeno (Aleve). Vera y siga todas las instrucciones de la [place]. · No tome dos o más analgésicos al mismo tiempo a menos que el médico se lo haya indicado. Muchos analgésicos contienen acetaminofén, es decir, Tylenol. El exceso de acetaminofén (Tylenol) puede ser dañino. · Si rowell médico le recetó antibióticos, tómelos según las indicaciones. No deje de tomarlos por el hecho de sentirse mejor. Debe rosalia todos los antibióticos hasta terminarlos. · Si rowell médico se lo recomienda, tome medicamento para la tos según las indicaciones. · Evite las actividades que empeoren rowell dolor. ¿Cuándo debe pedir ayuda? Llame al 911 en cualquier momento que considere que necesita atención de emergencia. Por ejemplo, llame si:  · [address] dificultades para respirar. · Tiene dolor intenso en el pecho. · Se desmayó (perdió el conocimiento). Llame a rowell médico ahora mismo o busque atención médica inmediata si:  · Vuelve a tener fiebre o [place]. Preste especial atención a los cambios en rowell natividad y asegúrese de comunicarse con rowell médico si:  · [place] a toser mucosidad de color amarillento o verdoso. · Tose angie. · Marry síntomas no mejoran en 3 ó 4 días. ¿Dónde puede encontrar más información en inglés? Franklin Bernabe a http://mayo-lukas.info/. Jose Wallace F346 en la búsqueda para aprender más acerca de \"Pleuritis: Instrucciones de cuidado - [ Pleurisy: Care Instructions ]. \"  Revisado: 25 Damas[address] Ala 2017  Versión del contenido: 11.3  © 9899-8803 Healthwise, Incorporated. Las instrucciones de cuidado fueron adaptadas bajo licencia por [employer] (which disclaims liability or warranty for this information). Si usted tiene [place] afección médica o sobre estas instrucciones, siempre pregunte a rowell profesional de natividad. [employer] niega toda garantía o responsabilidad por rowell uso de esta información. Dolor de pecho: Instrucciones de cuidado - [ Chest Pain: Care Instructions ]  Instrucciones de cuidado  El dolor de pecho puede tener muchas causas. Algunas no son graves y mejorarán por sí solas en pocos días. Eri algunos tipos de dolor de pecho requieren más pruebas y [place]. Es posible que rowell médico le haya recomendado miryam visita de seguimiento dentro de las 8 a 12 horas siguientes. Si no mejora, es posible que necesite [address] pruebas o [place]. Aunque rowell médico le haya dado de isaac, es necesario que esté atento a cualquier problema que se presente. El médico le hizo un cuidadoso chequeo, eri a veces los problemas pueden aparecer posteriormente. Si tiene nuevos síntomas o éstos no mejoran, obtenga atención médica de inmediato. Si tiene dolor o presión en el pecho que empeora o es diferente y que dura más de 5 minutos, o se desmayó (perdió el conocimiento), llame al 911 o busque otra ayuda de emergencia de inmediato. Acudir a miryam consulta médica es sólo un paso en rowell tratamiento. Aunque se sienta mejor, todavía deberá hacer lo que rowell médico le recomiende, ascencion asistir a todas las visitas de seguimiento sugeridas y rosalia los medicamentos exactamente [employer]	[place] fueron indicados. Belva le ayudará a recuperarse y prevenir problemas futuros. ¿Cómo puede cuidarse en el hogar? · Descanse hasta que se sienta mejor. · Yeager alberto medicamentos exactamente ascencion le fueron recetados. Llame a rowell médico si ayla estar teniendo problemas con rowell medicamento.   · No conduzca después de rosalia un analgésico (medicamento para el dolor) recetado. ¿Cuándo debe pedir ayuda? Llame al 911 si:  · Se desmayó (perdió el conocimiento). · Tiene graves dificultades para respirar. · Tiene síntomas de un ataque al corazón. Estos podrían incluir:  ¨ Dolor o presión en el pecho, o miryam sensación extraña en el pecho. ¨ Sudoración. ¨ Falta de aire. ¨ Náuseas o vómito. ¨ Dolor, presión o miryam sensación extraña en la espalda, el lance, la mandíbula, la parte superior del abdomen o en thania o ambos hombros o brazos. ¨ Aturdimiento o debilidad repentina. ¨ Latidos del corazón rápidos o irregulares. Después de llamar al 911, es posible que el operador le diga que mastique 1 aspirina para adultos o de 2 a 4 aspirinas de dosis baja. Espere a miryam ambulancia. No intente conducir usted mismo. Llame hoy a rowell médico si:  · Tiene cualquier dificultad para respirar. · El dolor en el pecho empeora. · Siente mareos o aturdimiento, o que está a punto de [place]. · No mejora ascencion se esperaba. · Tiene dolor de pecho nuevo o diferente. ¿Dónde puede encontrar más información en inglés? Franklin Bernabe a http://mayo-lukas.info/. Escriba A120 en la búsqueda para aprender más acerca de \"Dolor de pecho: Instrucciones de cuidado - [ Chest Pain: Care Instructions ]. \"  Revisado: 20 marzo, 2017  Versión del contenido: 11.3  © 5629-6708 [employer]. Las instrucciones de cuidado fueron adaptadas bajo licencia por [employer] (which disclaims liability or warranty for this information). Si usted tiene [place] afección médica o sobre estas instrucciones, siempre pregunte a rowell profesional de natividad. [facility] niega toda garantía o responsabilidad por rowell uso de esta información.

## 2017-10-13 NOTE — ED TRIAGE NOTES
Triage Note: Pt arrives via EMS from home for sciatica pain. Pt was seen this afternoon here and discharged with dx of sciatica, UTI and constipation. Pt stated pain has come back. Filled prescription and took first dose of medication. Only change since this afternoon is pt now complaining of mid-chest pain.

## 2017-10-13 NOTE — ED NOTES
Patient given discharge instructions and verbalized understanding; PIV discontinued. Patient provided with list of clinics for follow up that speak Armenian, patient and family member verbalized understanding. Patient ambulatory out of department with steady gait.

## 2017-10-14 LAB
ATRIAL RATE: 59 BPM
CALCULATED P AXIS, ECG09: 46 DEGREES
CALCULATED R AXIS, ECG10: 27 DEGREES
CALCULATED T AXIS, ECG11: 39 DEGREES
DIAGNOSIS, 93000: NORMAL
P-R INTERVAL, ECG05: 144 MS
Q-T INTERVAL, ECG07: 438 MS
QRS DURATION, ECG06: 84 MS
QTC CALCULATION (BEZET), ECG08: 433 MS
VENTRICULAR RATE, ECG03: 59 BPM

## 2019-10-05 ENCOUNTER — HOSPITAL ENCOUNTER (EMERGENCY)
Age: 39
Discharge: HOME | End: 2019-10-06
Attending: EMERGENCY MEDICINE | Admitting: EMERGENCY MEDICINE
Payer: SUBSIDIZED

## 2019-10-05 ENCOUNTER — APPOINTMENT (OUTPATIENT)
Dept: GENERAL RADIOLOGY | Age: 39
End: 2019-10-05
Attending: PHYSICIAN ASSISTANT
Payer: SUBSIDIZED

## 2019-10-05 DIAGNOSIS — T78.40XA ALLERGIC REACTION, INITIAL ENCOUNTER: Primary | ICD-10-CM

## 2019-10-05 LAB
BASOPHILS # BLD: 0 K/UL (ref 0–0.1)
BASOPHILS NFR BLD: 0 % (ref 0–1)
DIFFERENTIAL METHOD BLD: NORMAL
EOSINOPHIL # BLD: 0.2 K/UL (ref 0–0.4)
EOSINOPHIL NFR BLD: 4 % (ref 0–7)
ERYTHROCYTE [DISTWIDTH] IN BLOOD BY AUTOMATED COUNT: 12.9 % (ref 11.5–14.5)
HCT VFR BLD AUTO: 39.9 % (ref 35–47)
HGB BLD-MCNC: 12.8 G/DL (ref 11.5–16)
IMM GRANULOCYTES # BLD AUTO: 0 K/UL (ref 0–0.04)
IMM GRANULOCYTES NFR BLD AUTO: 0 % (ref 0–0.5)
LYMPHOCYTES # BLD: 2.8 K/UL (ref 0.8–3.5)
LYMPHOCYTES NFR BLD: 40 % (ref 12–49)
MCH RBC QN AUTO: 29.7 PG (ref 26–34)
MCHC RBC AUTO-ENTMCNC: 32.1 G/DL (ref 30–36.5)
MCV RBC AUTO: 92.6 FL (ref 80–99)
MONOCYTES # BLD: 0.6 K/UL (ref 0–1)
MONOCYTES NFR BLD: 8 % (ref 5–13)
NEUTS SEG # BLD: 3.3 K/UL (ref 1.8–8)
NEUTS SEG NFR BLD: 48 % (ref 32–75)
NRBC # BLD: 0 K/UL (ref 0–0.01)
NRBC BLD-RTO: 0 PER 100 WBC
PLATELET # BLD AUTO: 243 K/UL (ref 150–400)
PMV BLD AUTO: 11 FL (ref 8.9–12.9)
RBC # BLD AUTO: 4.31 M/UL (ref 3.8–5.2)
TROPONIN I BLD-MCNC: <0.04 NG/ML (ref 0–0.08)
WBC # BLD AUTO: 7 K/UL (ref 3.6–11)

## 2019-10-05 PROCEDURE — 74011250637 HC RX REV CODE- 250/637: Performed by: PHYSICIAN ASSISTANT

## 2019-10-05 PROCEDURE — 85025 COMPLETE CBC W/AUTO DIFF WBC: CPT

## 2019-10-05 PROCEDURE — 96374 THER/PROPH/DIAG INJ IV PUSH: CPT

## 2019-10-05 PROCEDURE — 80053 COMPREHEN METABOLIC PANEL: CPT

## 2019-10-05 PROCEDURE — 93005 ELECTROCARDIOGRAM TRACING: CPT

## 2019-10-05 PROCEDURE — 71046 X-RAY EXAM CHEST 2 VIEWS: CPT

## 2019-10-05 PROCEDURE — 36415 COLL VENOUS BLD VENIPUNCTURE: CPT

## 2019-10-05 PROCEDURE — 84484 ASSAY OF TROPONIN QUANT: CPT

## 2019-10-05 PROCEDURE — 99284 EMERGENCY DEPT VISIT MOD MDM: CPT

## 2019-10-05 PROCEDURE — 74011250636 HC RX REV CODE- 250/636: Performed by: PHYSICIAN ASSISTANT

## 2019-10-05 RX ORDER — DIPHENHYDRAMINE HCL 25 MG
25 CAPSULE ORAL
Status: COMPLETED | OUTPATIENT
Start: 2019-10-05 | End: 2019-10-05

## 2019-10-05 RX ORDER — KETOROLAC TROMETHAMINE 30 MG/ML
30 INJECTION, SOLUTION INTRAMUSCULAR; INTRAVENOUS
Status: COMPLETED | OUTPATIENT
Start: 2019-10-05 | End: 2019-10-05

## 2019-10-05 RX ADMIN — DIPHENHYDRAMINE HYDROCHLORIDE 25 MG: 25 CAPSULE ORAL at 23:46

## 2019-10-05 RX ADMIN — KETOROLAC TROMETHAMINE 30 MG: 30 INJECTION, SOLUTION INTRAMUSCULAR at 23:46

## 2019-10-06 VITALS
HEART RATE: 56 BPM | OXYGEN SATURATION: 99 % | TEMPERATURE: 98 F | HEIGHT: 62 IN | DIASTOLIC BLOOD PRESSURE: 83 MMHG | RESPIRATION RATE: 18 BRPM | BODY MASS INDEX: 22.08 KG/M2 | SYSTOLIC BLOOD PRESSURE: 124 MMHG | WEIGHT: 120 LBS

## 2019-10-06 LAB
ALBUMIN SERPL-MCNC: 3.8 G/DL (ref 3.5–5)
ALBUMIN/GLOB SERPL: 1 {RATIO} (ref 1.1–2.2)
ALP SERPL-CCNC: 80 U/L (ref 45–117)
ALT SERPL-CCNC: 23 U/L (ref 12–78)
ANION GAP SERPL CALC-SCNC: 6 MMOL/L (ref 5–15)
AST SERPL-CCNC: 16 U/L (ref 15–37)
ATRIAL RATE: 61 BPM
BILIRUB SERPL-MCNC: 0.5 MG/DL (ref 0.2–1)
BUN SERPL-MCNC: 15 MG/DL (ref 6–20)
BUN/CREAT SERPL: 22 (ref 12–20)
CALCIUM SERPL-MCNC: 8.9 MG/DL (ref 8.5–10.1)
CALCULATED P AXIS, ECG09: 62 DEGREES
CALCULATED R AXIS, ECG10: 22 DEGREES
CALCULATED T AXIS, ECG11: 33 DEGREES
CHLORIDE SERPL-SCNC: 109 MMOL/L (ref 97–108)
CO2 SERPL-SCNC: 26 MMOL/L (ref 21–32)
CREAT SERPL-MCNC: 0.68 MG/DL (ref 0.55–1.02)
DIAGNOSIS, 93000: NORMAL
GLOBULIN SER CALC-MCNC: 4 G/DL (ref 2–4)
GLUCOSE SERPL-MCNC: 112 MG/DL (ref 65–100)
P-R INTERVAL, ECG05: 128 MS
POTASSIUM SERPL-SCNC: 3.7 MMOL/L (ref 3.5–5.1)
PROT SERPL-MCNC: 7.8 G/DL (ref 6.4–8.2)
Q-T INTERVAL, ECG07: 418 MS
QRS DURATION, ECG06: 72 MS
QTC CALCULATION (BEZET), ECG08: 420 MS
SODIUM SERPL-SCNC: 141 MMOL/L (ref 136–145)
VENTRICULAR RATE, ECG03: 61 BPM

## 2019-10-06 RX ORDER — DIPHENHYDRAMINE HCL 25 MG
25 CAPSULE ORAL
Qty: 12 CAP | Refills: 0 | Status: SHIPPED | OUTPATIENT
Start: 2019-10-06 | End: 2019-10-16

## 2019-10-06 NOTE — ED PROVIDER NOTES
This is a 43 yo  female with complaint of generalized myalgias and urticaria rash to the chest, abdomen and back. She [place] being stung [place]. She has not taken anything for pain or rash. No new soaps, lotions, detergents or medication. Reports CP. No fever, chills, cough, runny nose, sore throat, abdominal pain, nausea, vomiting, diarrhea or urinary complaint. History reviewed. No pertinent past medical history. History reviewed. No pertinent surgical history. History reviewed. No pertinent family history.     Social History     Socioeconomic History    Marital status: SINGLE     Spouse name: Not on file    Number of children: Not on file    Years of education: Not on file    Highest education level: Not on file   Occupational History    Not on file   Social Needs    Financial resource strain: Not on file    Food insecurity:     Worry: Not on file     Inability: Not on file    Transportation needs:     Medical: Not on file     Non-medical: Not on file   Tobacco Use    Smoking status: Never Smoker    Smokeless tobacco: Never Used   Substance and Sexual Activity    Alcohol use: No    Drug use: Not on file    Sexual activity: Not on file   Lifestyle    Physical activity:     Days per week: Not on file     Minutes per session: Not on file    Stress: Not on file   Relationships    Social connections:     Talks on phone: Not on file     Gets together: Not on file     Attends Hinduism service: Not on file     Active member of club or organization: Not on file     Attends meetings of clubs or organizations: Not on file     Relationship status: Not on file    Intimate partner violence:     Fear of current or ex partner: Not on file     Emotionally abused: Not on file     Physically abused: Not on file     Forced sexual activity: Not on file   Other Topics Concern    Not on file   Social History Narrative    Not on file         ALLERGIES: Patient has no known allergies. Review of Systems   Constitutional: Negative. Negative for chills, fatigue and fever. HENT: Negative. Negative for congestion, ear pain, facial swelling, rhinorrhea, sneezing and sore throat. Respiratory: Negative for cough and shortness of breath. Cardiovascular: Negative. Negative for chest pain. Gastrointestinal: Negative. Negative for abdominal pain, constipation, diarrhea, nausea and vomiting. Genitourinary: Negative for difficulty urinating, frequency and urgency. Musculoskeletal: Positive for myalgias. Skin: Positive for rash. Neurological: Negative for headaches. All other systems reviewed and are negative. Vitals:    10/05/19 2238 10/05/19 2315 10/06/19 0114   BP:  122/57 124/83   Pulse: 73 73 (!) 56   Resp:  20 18   Temp:  98.5 °F (36.9 °C) 98 °F (36.7 °C)   SpO2: 98% 98% 99%   Weight:  54.4 kg (120 lb)    Height:  5' 2\" (1.575 m)             Physical Exam   Constitutional: She is oriented to person, place, and time. She appears well-developed and well-nourished. No distress. HENT:   Head: Normocephalic and atraumatic. Right Ear: External ear normal.   Left Ear: External ear normal.   Nose: Nose normal.   Mouth/Throat: Oropharynx is clear and moist. No oropharyngeal exudate. Eyes: Pupils are equal, round, and reactive to light. Conjunctivae are normal.   Neck: Normal range of motion. Neck supple. Cardiovascular: Normal rate, regular rhythm and normal heart sounds. Pulmonary/Chest: Effort normal. No respiratory distress. She has no wheezes. Abdominal: Soft. Bowel sounds are normal. She exhibits no distension. There is no tenderness. There is no rebound. Musculoskeletal: Normal range of motion. Neurological: She is alert and oriented to person, place, and time. Skin: Skin is warm and dry. Rash (urticaria lesions to the chest, abdomen, and back) noted. No ecchymosis, no laceration and no lesion noted. Nursing note and vitals reviewed. MDM  Number of Diagnoses or Management Options  Allergic reaction, initial encounter:   Diagnosis management comments: 43 yo female with complaint of generalized myalgias following a bee sting and also with urticaria rash. Appears well with stable vitals. No e/o TENS, SJS or serious etiology. Suspect allergic reaction.  Improved with benadryl [place]         Amount and/or Complexity of Data Reviewed  Clinical lab tests: ordered and reviewed  Tests in the radiology section of CPT®: ordered and reviewed  Independent visualization of images, tracings, or specimens: yes           Procedures

## 2019-10-06 NOTE — DISCHARGE INSTRUCTIONS
Patient Education        Reacción alérgica: Instrucciones de cuidado - [ Allergic Reaction: Care Instructions ]  Instrucciones de cuidado    [place] reacción alérgica es miryam respuesta excesiva del sistema inmunitario a un medicamento, miryam sustancia química, un alimento, [place] de insecto u otra sustancia. [place] reacción puede variar desde leve hasta potencialmente mortal. Algunas personas presentan salpullido leve, urticaria (ronchas) y comezón o retortijones. Cuando las reacciones son graves, la hinchazón de la lengua y la garganta puede obstruir las Ariane Francis. La atención de seguimiento es miryam parte clave de rowell tratamiento y seguridad. Asegúrese de hacer y acudir a todas las citas, y llame a rowell médico si está teniendo problemas. También es miryam buena idea saber los resultados de alberto exámenes y mantener miryam lista de los medicamentos que paresh. ¿Cómo puede cuidarse en el hogar? · Si sabe qué causó rowell reacción alérgica, asegúrese de evitarlo. Rowell alergia podría empeorar cada vez que tenga miryam reacción. · Raven un antihistamínico de bronsona Carlos, ascencion cetirizina (Zyrtec) o loratadina (Claritin), para tratar los síntomas leves. Vera y siga las indicaciones de la etiqueta. Algunos antihistamínicos pueden causar somnolencia (sueño). No le dé antihistamínicos a un kandace sin hablar tonie con rowell médico. Los síntomas leves incluyen estornudos o comezón o goteo nasal, comezón en la boca, algunas ronchas (urticaria) o comezón leve y náuseas leves o malestar estomacal.  · No se rasque las ronchas ni el salpullido. Póngase miryam toalla húmeda fría sobre la tutu afectada o dese leonidas fríos para aliviar la comezón. Póngase compresas de [employer], la hinchazón o las picaduras de insectos por entre 10 y 15 minutos cada vez. Póngase un paño soto entre el hielo y la piel. No se dé leonidas ni duchas calientes. Empeorarán la comezón.   · Es posible que rowell médico le recete miryam inyección de epinefrina para que la lleve consigo en darrell de que tenga miryam reacción grave. Aprenda a aplicarse la inyección usted mismo y llévela consigo todo el [place]. Asegúrese de que no haya caducado. · Vaya a la nena de urgencias cada vez que tenga miryam reacción grave, incluso si ya se ha administrado la inyección de epinefrina y se siente mejor. Los síntomas pueden reaparecer después de aplicarse la inyección. · Use un brazalete o collar de alerta médica que tenga miryam lista de alberto [place]. Estos productos pueden comprarse en la mayoría de las [facility]. · Si rowell hijo tiene [place] grave, asegúrese de que alberto profesores, niñeras, entrenadores y otras personas encargadas de rowell cuidado sepan acerca de la alergia. Deben tener miryam inyección de epinefrina, saber cómo y cuándo administrársela, y tener un plan para llevar a rowell hijo al hospital.  ¿Cuándo debe pedir ayuda? Aplíquese miryam inyección de epinefrina si:    · Piensa que está teniendo miryam reacción alérgica grave.     · Tiene síntomas en más de miryam tutu del cuerpo, ascencion náuseas leves y comezón en la boca.    Después de aplicarse miyram inyección de epinefrina, llame al  911 incluso si se siente mejor.   Llame al 911 si:    · Tiene síntomas de miryam reacción alérgica grave. Estos pueden incluir:  ? Zonas abultadas y enrojecidas (ronchas) que aparecen repentinamente por todo el cuerpo. ? Hinchazón de la garganta, la boca, los labios o la [place]. ? Dificultad para respirar. ? Pérdida del conocimiento [facility]). O podría sentirse muy aturdido o de repente sentirse débil, confuso o agitado.     · Le maier aplicado miryam inyección de epinefrina, incluso si se siente mejor.    Llame a rowell médico ahora mismo o busque atención médica inmediata si:    · Tiene síntomas de miryam reacción alérgica, tales ascencion:  ? Salpullido o ronchas (zonas abultadas y enrojecidas en la piel). ? Comezón. ? Sudie Noni.   ? Dolor abdominal, náuseas o vómito.    Preste especial atención a los cambios en rowell natividad y asegúrese de comunicarse con rowell médico si:    · No mejora ascencion se esperaba. ¿Dónde puede encontrar más información en inglés? Les Fernando a http://mayo-lukas.info/. Amari Aline B624 en la búsqueda para aprender más acerca de \"Reacción alérgica: Instrucciones de cuidado - [ Allergic Reaction: Care Instructions ]. \"  Revisado: 7 natalee, 2019  Versión del contenido: 12.2  © 0619-3201 Healthwise, Incorporated. Las instrucciones de cuidado fueron adaptadas bajo licencia por [employer] (which disclaims liability or warranty for this information). Si usted tiene [place] afección médica o sobre estas instrucciones, siempre pregunte a rowell profesional de natividad. Healthwise, Incorporated niega toda garantía o responsabilidad por rowell uso de esta información.

## 2019-10-06 NOTE — ED TRIAGE NOTES
Triage Note: Patient was stung by bee 2 weeks ago and patient continues with bumps to the body. Patient states having intermittent chest pain for the past 2 days.

## 2020-11-06 ENCOUNTER — HOSPITAL ENCOUNTER (EMERGENCY)
Age: 40
Discharge: HOME | End: 2020-11-06
Attending: EMERGENCY MEDICINE | Admitting: EMERGENCY MEDICINE

## 2020-11-06 VITALS
TEMPERATURE: 98 F | RESPIRATION RATE: 16 BRPM | HEART RATE: 63 BPM | DIASTOLIC BLOOD PRESSURE: 63 MMHG | SYSTOLIC BLOOD PRESSURE: 112 MMHG | OXYGEN SATURATION: 98 %

## 2020-11-06 DIAGNOSIS — R31.9 URINARY TRACT INFECTION WITH HEMATURIA, SITE UNSPECIFIED: Primary | ICD-10-CM

## 2020-11-06 DIAGNOSIS — N39.0 URINARY TRACT INFECTION WITH HEMATURIA, SITE UNSPECIFIED: Primary | ICD-10-CM

## 2020-11-06 DIAGNOSIS — R10.823 RIGHT LOWER QUADRANT ABDOMINAL TENDERNESS WITH REBOUND TENDERNESS: ICD-10-CM

## 2020-11-06 LAB
ALBUMIN SERPL-MCNC: 3.8 G/DL (ref 3.5–5)
ALBUMIN/GLOB SERPL: 1 {RATIO} (ref 1.1–2.2)
ALP SERPL-CCNC: 64 U/L (ref 45–117)
ALT SERPL-CCNC: 27 U/L (ref 12–78)
ANION GAP SERPL CALC-SCNC: 3 MMOL/L (ref 5–15)
APPEARANCE UR: ABNORMAL
AST SERPL-CCNC: 20 U/L (ref 15–37)
BACTERIA URNS QL MICRO: ABNORMAL /HPF
BASOPHILS # BLD: 0 K/UL (ref 0–0.1)
BASOPHILS NFR BLD: 0 % (ref 0–1)
BILIRUB SERPL-MCNC: 0.6 MG/DL (ref 0.2–1)
BILIRUB UR QL: NEGATIVE
BUN SERPL-MCNC: 11 MG/DL (ref 6–20)
BUN/CREAT SERPL: 16 (ref 12–20)
CALCIUM SERPL-MCNC: 9 MG/DL (ref 8.5–10.1)
CHLORIDE SERPL-SCNC: 110 MMOL/L (ref 97–108)
CO2 SERPL-SCNC: 25 MMOL/L (ref 21–32)
COLOR UR: ABNORMAL
COMMENT, HOLDF: NORMAL
CREAT SERPL-MCNC: 0.67 MG/DL (ref 0.55–1.02)
DIFFERENTIAL METHOD BLD: NORMAL
EOSINOPHIL # BLD: 0.2 K/UL (ref 0–0.4)
EOSINOPHIL NFR BLD: 2 % (ref 0–7)
EPITH CASTS URNS QL MICRO: ABNORMAL /LPF
ERYTHROCYTE [DISTWIDTH] IN BLOOD BY AUTOMATED COUNT: 12.9 % (ref 11.5–14.5)
GLOBULIN SER CALC-MCNC: 4 G/DL (ref 2–4)
GLUCOSE SERPL-MCNC: 90 MG/DL (ref 65–100)
GLUCOSE UR STRIP.AUTO-MCNC: NEGATIVE MG/DL
HCG UR QL: NEGATIVE
HCT VFR BLD AUTO: 39.2 % (ref 35–47)
HGB BLD-MCNC: 12.9 G/DL (ref 11.5–16)
HGB UR QL STRIP: ABNORMAL
IMM GRANULOCYTES # BLD AUTO: 0 K/UL (ref 0–0.04)
IMM GRANULOCYTES NFR BLD AUTO: 0 % (ref 0–0.5)
KETONES UR QL STRIP.AUTO: ABNORMAL MG/DL
LEUKOCYTE ESTERASE UR QL STRIP.AUTO: ABNORMAL
LYMPHOCYTES # BLD: 2.5 K/UL (ref 0.8–3.5)
LYMPHOCYTES NFR BLD: 32 % (ref 12–49)
MCH RBC QN AUTO: 29.9 PG (ref 26–34)
MCHC RBC AUTO-ENTMCNC: 32.9 G/DL (ref 30–36.5)
MCV RBC AUTO: 90.7 FL (ref 80–99)
MONOCYTES # BLD: 0.7 K/UL (ref 0–1)
MONOCYTES NFR BLD: 9 % (ref 5–13)
MUCOUS THREADS URNS QL MICRO: ABNORMAL /LPF
NEUTS SEG # BLD: 4.4 K/UL (ref 1.8–8)
NEUTS SEG NFR BLD: 57 % (ref 32–75)
NITRITE UR QL STRIP.AUTO: NEGATIVE
NRBC # BLD: 0 K/UL (ref 0–0.01)
NRBC BLD-RTO: 0 PER 100 WBC
PH UR STRIP: 5.5 [PH] (ref 5–8)
PLATELET # BLD AUTO: 266 K/UL (ref 150–400)
PMV BLD AUTO: 10.8 FL (ref 8.9–12.9)
POTASSIUM SERPL-SCNC: 3.9 MMOL/L (ref 3.5–5.1)
PROT SERPL-MCNC: 7.8 G/DL (ref 6.4–8.2)
PROT UR STRIP-MCNC: NEGATIVE MG/DL
RBC # BLD AUTO: 4.32 M/UL (ref 3.8–5.2)
RBC #/AREA URNS HPF: ABNORMAL /HPF (ref 0–5)
SAMPLES BEING HELD,HOLD: NORMAL
SODIUM SERPL-SCNC: 138 MMOL/L (ref 136–145)
SP GR UR REFRACTOMETRY: 1.03 (ref 1–1.03)
UR CULT HOLD, URHOLD: NORMAL
UROBILINOGEN UR QL STRIP.AUTO: 1 EU/DL (ref 0.2–1)
WBC # BLD AUTO: 7.8 K/UL (ref 3.6–11)
WBC URNS QL MICRO: ABNORMAL /HPF (ref 0–4)

## 2020-11-06 PROCEDURE — 36415 COLL VENOUS BLD VENIPUNCTURE: CPT

## 2020-11-06 PROCEDURE — 85025 COMPLETE CBC W/AUTO DIFF WBC: CPT

## 2020-11-06 PROCEDURE — 99283 EMERGENCY DEPT VISIT LOW MDM: CPT

## 2020-11-06 PROCEDURE — 96374 THER/PROPH/DIAG INJ IV PUSH: CPT

## 2020-11-06 PROCEDURE — 87086 URINE CULTURE/COLONY COUNT: CPT

## 2020-11-06 PROCEDURE — 74011250636 HC RX REV CODE- 250/636: Performed by: NURSE PRACTITIONER

## 2020-11-06 PROCEDURE — 81025 URINE PREGNANCY TEST: CPT

## 2020-11-06 PROCEDURE — 80053 COMPREHEN METABOLIC PANEL: CPT

## 2020-11-06 PROCEDURE — 81001 URINALYSIS AUTO W/SCOPE: CPT

## 2020-11-06 RX ORDER — IBUPROFEN 600 MG/1
600 TABLET ORAL
Qty: 20 TAB | Refills: 0 | Status: SHIPPED | OUTPATIENT
Start: 2020-11-06 | End: 2021-01-26 | Stop reason: ALTCHOICE

## 2020-11-06 RX ORDER — NITROFURANTOIN (MACROCRYSTALS) 100 MG/1
100 CAPSULE ORAL 2 TIMES DAILY
Qty: 10 CAP | Refills: 0 | Status: SHIPPED | OUTPATIENT
Start: 2020-11-06 | End: 2020-11-11

## 2020-11-06 RX ORDER — KETOROLAC TROMETHAMINE 30 MG/ML
15 INJECTION, SOLUTION INTRAMUSCULAR; INTRAVENOUS
Status: COMPLETED | OUTPATIENT
Start: 2020-11-06 | End: 2020-11-06

## 2020-11-06 RX ADMIN — KETOROLAC TROMETHAMINE 15 MG: 30 INJECTION, SOLUTION INTRAMUSCULAR at 17:33

## 2020-11-06 NOTE — ED PROVIDER NOTES
70-year-old female with no past medical history presents ambulatory with complaints of lower abdominal pain x3 weeks. When she uses she urinates it hurts, smells bad and goes frequently. No vaginal discharge, denies concern for sexually transmitted disease. LMP: Oct 14th. Generalized weakness. Otherwise denies fever, chills, CP,SOB. States that she is eating and drinking without difficulty. No past medical history on file. No past surgical history on file. No family history on file. Social History     Socioeconomic History    Marital status: SINGLE     Spouse name: Not on file    Number of children: Not on file    Years of education: Not on file    Highest education level: Not on file   Occupational History    Not on file   Social Needs    Financial resource strain: Not on file    Food insecurity     Worry: Not on file     Inability: Not on file    Transportation needs     Medical: Not on file     Non-medical: Not on file   Tobacco Use    Smoking status: Never Smoker    Smokeless tobacco: Never Used   Substance and Sexual Activity    Alcohol use: No    Drug use: Not on file    Sexual activity: Not on file   Lifestyle    Physical activity     Days per week: Not on file     Minutes per session: Not on file    Stress: Not on file   Relationships    Social connections     Talks on phone: Not on file     Gets together: Not on file     Attends Yazidism service: Not on file     Active member of club or organization: Not on file     Attends meetings of clubs or organizations: Not on file     Relationship status: Not on file    Intimate partner violence     Fear of current or ex partner: Not on file     Emotionally abused: Not on file     Physically abused: Not on file     Forced sexual activity: Not on file   Other Topics Concern    Not on file   Social History Narrative    Not on file         ALLERGIES: Patient has no known allergies.     Review of Systems   Constitutional: Positive for fatigue. Negative for appetite change, chills and fever. Respiratory: Negative for cough and shortness of breath. Cardiovascular: Negative for chest pain. Genitourinary: Positive for dysuria and frequency. Negative for difficulty urinating, vaginal discharge and vaginal pain. Musculoskeletal: Negative for back pain and myalgias. Vitals:    11/06/20 1508   BP: 112/63   Pulse: 63   Resp: 16   Temp: 98 °F (36.7 °C)   SpO2: 98%            Physical Exam  Vitals signs and nursing note reviewed. Constitutional:       Appearance: Normal appearance. HENT:      Head: Normocephalic. Nose: Nose normal.   Neck:      Musculoskeletal: Normal range of motion. Cardiovascular:      Rate and Rhythm: Normal rate. Pulmonary:      Effort: Pulmonary effort is normal. No respiratory distress. Abdominal:      General: Abdomen is flat. Bowel sounds are normal. There is no distension. Palpations: Abdomen is soft. Tenderness: There is abdominal tenderness in the right lower quadrant. There is no right CVA tenderness, left CVA tenderness, guarding or rebound. Negative signs include Caballero's sign and McBurney's sign. Musculoskeletal: Normal range of motion. Skin:     General: Skin is dry. Neurological:      Mental Status: She is alert and oriented to person, place, and time.    Psychiatric:         Mood and Affect: Mood normal.          MDM  Number of Diagnoses or Management Options  Right lower quadrant abdominal tenderness with rebound tenderness:   Urinary tract infection with hematuria, site unspecified:   Diagnosis management comments: Possible: dehydration vs UTI vs pregnancy vs     VITAL SIGNS:  Patient Vitals for the past 4 hrs:   Temp Pulse Resp BP SpO2   11/06/20 1508 98 °F (36.7 °C) 63 16 112/63 98 %         LABS:  Recent Results (from the past 6 hour(s))   SAMPLES BEING HELD    Collection Time: 11/06/20  3:20 PM   Result Value Ref Range    SAMPLES BEING HELD 1RED, 1BLU COMMENT        Add-on orders for these samples will be processed based on acceptable specimen integrity and analyte stability, which may vary by analyte. METABOLIC PANEL, COMPREHENSIVE    Collection Time: 11/06/20  3:20 PM   Result Value Ref Range    Sodium 138 136 - 145 mmol/L    Potassium 3.9 3.5 - 5.1 mmol/L    Chloride 110 (H) 97 - 108 mmol/L    CO2 25 21 - 32 mmol/L    Anion gap 3 (L) 5 - 15 mmol/L    Glucose 90 65 - 100 mg/dL    BUN 11 6 - 20 MG/DL    Creatinine 0.67 0.55 - 1.02 MG/DL    BUN/Creatinine ratio 16 12 - 20      GFR est AA >60 >60 ml/min/1.73m2    GFR est non-AA >60 >60 ml/min/1.73m2    Calcium 9.0 8.5 - 10.1 MG/DL    Bilirubin, total 0.6 0.2 - 1.0 MG/DL    ALT (SGPT) 27 12 - 78 U/L    AST (SGOT) 20 15 - 37 U/L    Alk. phosphatase 64 45 - 117 U/L    Protein, total 7.8 6.4 - 8.2 g/dL    Albumin 3.8 3.5 - 5.0 g/dL    Globulin 4.0 2.0 - 4.0 g/dL    A-G Ratio 1.0 (L) 1.1 - 2.2     CBC WITH AUTOMATED DIFF    Collection Time: 11/06/20  3:20 PM   Result Value Ref Range    WBC 7.8 3.6 - 11.0 K/uL    RBC 4.32 3.80 - 5.20 M/uL    HGB 12.9 11.5 - 16.0 g/dL    HCT 39.2 35.0 - 47.0 %    MCV 90.7 80.0 - 99.0 FL    MCH 29.9 26.0 - 34.0 PG    MCHC 32.9 30.0 - 36.5 g/dL    RDW 12.9 11.5 - 14.5 %    PLATELET 005 814 - 680 K/uL    MPV 10.8 8.9 - 12.9 FL    NRBC 0.0 0  WBC    ABSOLUTE NRBC 0.00 0.00 - 0.01 K/uL    NEUTROPHILS 57 32 - 75 %    LYMPHOCYTES 32 12 - 49 %    MONOCYTES 9 5 - 13 %    EOSINOPHILS 2 0 - 7 %    BASOPHILS 0 0 - 1 %    IMMATURE GRANULOCYTES 0 0.0 - 0.5 %    ABS. NEUTROPHILS 4.4 1.8 - 8.0 K/UL    ABS. LYMPHOCYTES 2.5 0.8 - 3.5 K/UL    ABS. MONOCYTES 0.7 0.0 - 1.0 K/UL    ABS. EOSINOPHILS 0.2 0.0 - 0.4 K/UL    ABS. BASOPHILS 0.0 0.0 - 0.1 K/UL    ABS. IMM.  GRANS. 0.0 0.00 - 0.04 K/UL    DF AUTOMATED     URINALYSIS W/MICROSCOPIC    Collection Time: 11/06/20  3:20 PM   Result Value Ref Range    Color YELLOW/STRAW      Appearance CLOUDY (A) CLEAR      Specific gravity 1.028 1.003 - 1.030 pH (UA) 5.5 5.0 - 8.0      Protein Negative NEG mg/dL    Glucose Negative NEG mg/dL    Ketone TRACE (A) NEG mg/dL    Bilirubin Negative NEG      Blood TRACE (A) NEG      Urobilinogen 1.0 0.2 - 1.0 EU/dL    Nitrites Negative NEG      Leukocyte Esterase SMALL (A) NEG      WBC 5-10 0 - 4 /hpf    RBC 0-5 0 - 5 /hpf    Epithelial cells MANY (A) FEW /lpf    Bacteria 1+ (A) NEG /hpf    Mucus TRACE (A) NEG /lpf   URINE CULTURE HOLD SAMPLE    Collection Time: 11/06/20  3:20 PM    Specimen: Serum; Urine   Result Value Ref Range    Urine culture hold        Urine on hold in Microbiology dept for 2 days. If unpreserved urine is submitted, it cannot be used for addtional testing after 24 hours, recollection will be required. HCG URINE, QL. - POC    Collection Time: 11/06/20  3:24 PM   Result Value Ref Range    Pregnancy test,urine (POC) Negative NEG          IMAGING:  No orders to display         Medications During Visit:  Medications   ketorolac (TORADOL) injection 15 mg (15 mg IntraVENous Given 11/6/20 1733)         DECISION MAKING:  Maggie Courser is a 47 y.o. female who comes in as above. Otherwise healthy 45-year-old female presents with right lower quadrant tenderness x3 weeks, patient states that she has been fatigued, has increased frequency and pain with urination. Patient states that she has had previous UTIs in the past and this feels very similar. Patient states that she is eating and drinking without difficulty, denies fever, chills, vaginal discharge, denies any possibility of STD. Upon initial assessment lab work and urine results were already completed, plan discussed with patient to dose with one-time IV Toradol for discomfort, and sent home with Rx with ibuprofen and nitrofurantoin for the UTI. Patient states that she does not have a PCP, patient provided with the low-cost/free clinic information and advised to follow-up with her primary care in 1 week upon completion of antibiotics.   Patient advised to return to the ER with worsening of symptoms. Patient verbalized understanding agrees with plan, patient hemodynamically stable, not tachycardic, BP normal and afebrile. IMPRESSION:  1. Urinary tract infection with hematuria, site unspecified    2. Right lower quadrant abdominal tenderness with rebound tenderness        DISPOSITION:  Discharged      Current Discharge Medication List      START taking these medications    Details   nitrofurantoin (MACRODANTIN) 100 mg capsule Take 1 Cap by mouth two (2) times a day for 5 days. Qty: 10 Cap, Refills: 0      ibuprofen (MOTRIN) 600 mg tablet Take 1 Tab by mouth every six (6) hours as needed for Pain. Qty: 20 Tab, Refills: 0              Follow-up Information     Follow up With Specialties Details Why Contact Info    [address], [facility] Emergency Medicine  If symptoms worsen [address] [facility]  [phone]    You may take Tylenol 650 mg or Ibuprofen 600 mg    Every 4-6 hours as needed for pain discomfort. Ensure that you are food on your stomach prior to taking. The patient is asked to follow-up with their primary care provider in the next several days. They are to call tomorrow for an appointment. The patient is asked to return promptly for any increased concerns or worsening of symptoms. They can return to this emergency department or any other emergency department.          Procedures    Jennie Whittaker NP  5:38 PM

## 2020-11-06 NOTE — DISCHARGE INSTRUCTIONS
Increase your water intake and if you can add lemon aide or cranberry juice, the acidity will help with the urinary tract infection. Refer to the sheet below, I'd like for you to establish care with Primary Care Provider and make a follow-up a appointment in 1 week. Return to the ER with worsening of symptoms. [facility] Departments     For adult and child immunizations, family planning, TB screening, STD testing and women's health services. [facility]: [place] [phone]      [facility] 25   [address]   [address]   170 [facility]: Cindy Buitrago [address] [facility] [address] [phone]      2407 [facility]          [address] [place] [address] [address]     For primary care services, woman and child wellness, and some clinics providing specialty care. [facility] -- 1011 [facility]. [address] [place] [address] [phone]   411 [facility] 200 [facility] 3614 [facility] [phone]   [address] [place] [address] [address] [place] [address] [phone]   [address]  [address] [place] [address] 5850  Community  [phone]   [address] [place] [address] [phone]   [facility] 81 [facility] [phone]   [facility] [address] [place] [address] [phone]   [facility]: [facility] rosalee Hernandez [address] [place] [address], [phone]     [facility] [address] [facility] [address] [phone]   [place] 5850  Community  [phone]   [facility]  [address], [address] ([email or web address]) [phone]         Sexual Health/Woman Wellness Clinics    For STD/HIV testing and treatment, pregnancy testing and services, men's health, birth control services, LGBT services, and hepatitis/HPV vaccine services. [facility] for [place] [employer] [address].  [facility] 603 S [facility] [address] [facility] 1579 [address] [phone]   [facility] [address], 5th floor [phone]   Pregnancy [address] for Women [address]  [place] [phone]         [place] 9967 [facility] [address] [place] [address]   [phone]   [place]   [phone]   Women, Infant and Children's Services: [address] [phone]       600 [facility]   [phone]   Ardella Ratel   [phone] 6125 [facility] Psychiatry     [phone]   [address] Crisis   1212 [facility] [phone]       Local Primary Care Physicians  [facility] [phone]  MD Helena Padron MD Regenia Brine, MD [facility] Doctors [phone]  Clenton Maranda, [facility]  MD Glenn Mclaughlin MD Taft Ny, MD [address] [phone]  MD Bree Sinha MD [facility] [phone]  MD Jose Méndez MD Cori Frederic, MD Luster Crimes, MD   [facility] [phone]  Psychiatric hospitalMD Delia VENTURA MD  Gisele , NP [address] [phone]  MD Marleny Batista MD Netty Plants, MD Lonzell Dose, MD Gearldine Harris, MD [address] Route, MD Hortense Cullens, MD   [facility]. [address] [phone]  Nikhil Mckenzie MD [facility] [phone]  MD [place] Martin, NP  MD Sis Davis MD Leafy Navy, MD Elouise Li, MD Norene Ammons, MD   8250 [facility] [phone]  MD Aleksandra Coffey, FNP  Lanora Soulier, MD Angela Olvera MD Lorilee Anton MD Rogers Cruz MD EPHRAIM [facility] [phone]  MD Lacy Gaona MD Alcario Hard, MD Venkat Ye MD   [address] [phone]  MD Tito Valdez MD [place] [phone]  Garrett Cooks, MD Royal Genera, MD Marco A Brooke MD   [facility] [phone]  Prosper Galicia, MD Gaurav Lee, MD Kerry Lynch, MD Judith Correa, MD Alexandrea Viera, NP  Go Kathleen MD [facility]9 [facility]   [phone]  MD Mohini Peoples MD Evlyn Mallick, MD   2102 [facility] [phone]  [place] [address], MD Seth Mcgovern, FNP  Maggie Collins, PAMIKE Collins, FNP  BOSTON Nunes MD Armon Forte, MONY Whitaker, DO Miscellaneous:  Iftikhar Kent [phone]

## 2020-11-06 NOTE — ED NOTES
MD reviewed discharge instructions and options with patient and patient verbalized understanding. RN reviewed discharge instructions using teachback method. Pt ambulated to exit without difficulty and in no signs of acute distress escorted by male , and he  will drive home. No complaints or needs expressed at this time. Patient was counseled on medications prescribed at discharge. VSS, verbalized relief from most intense pain. Patient to call her pcp in the morning for appointment.

## 2020-11-06 NOTE — ED TRIAGE NOTES
Triage: Pt arrives ambulatory from home with CC of lower abdominal pain x3 weeks. Denies N/V. Denies urinary symptoms.

## 2020-11-07 LAB
BACTERIA SPEC CULT: NORMAL
SERVICE CMNT-IMP: NORMAL

## 2021-01-05 ENCOUNTER — TELEPHONE (OUTPATIENT)
Dept: FAMILY MEDICINE CLINIC | Age: 41
End: 2021-01-05

## 2021-01-07 NOTE — TELEPHONE ENCOUNTER
Tc to the pt returning her call for request of prenatal resources. Michele Rolle assisted with the call. The pt has taken a home pregnancy test it was +. The pt LMP=09/08/2020. The ALBERT= 06/14/2021. The pt denies severe abdominal pain or vaginal bleeding. The pt has 6 children ranging in age from 23 yr old to 1 yrs old. They all were born here in [facility]. The pt had 3 of the children at 35 weeks and she is concerned that this will happen again. The pt was asked if she wanted to go back to [facility] for prenatal care. The pt stated she went to [facility] for prenatal care and had the babies at [facility]. She frefers to be referred to [facility]. She was informed her baby would be delivered at [facility]. Provided pt with information for [employer] [facility] and explained how to obtain prenatal care. Told pt to make sure she lets them know she does not have insurance when she calls to make her appt at [facility]. Explained that the care card is our financial assistance program.  Told pt that they will be required to do a financial screening. Advised them that they must complete the application and mail it to this company in order to qualify for the care card so it is very important to do this. Also advised pt to start prenatal vitamins as soon as possible. Pt indicated understanding and had no questions. A staff message was routed to the [facility] to please call and schedule the pt an initial prenatal appt.  Rohith Asher RN

## 2021-01-07 NOTE — TELEPHONE ENCOUNTER
After reviewing the pt's chart the pt went to the ED in Nov 2020 and had a negative pregnancy test. The pt was called back to ask her about this. No answer. No message was left. Will try to call the pt  tomorrow.   James Tavarez RN

## 2021-01-08 NOTE — TELEPHONE ENCOUNTER
Tc to the pt Deep Manrique was the . The pt was called to ask about her negative pregnancy test at the hospital in Nov she stated yes she did have a negative test in the hospital but later she took a home pregnancy test and it was positive. The pt was advised not to take any Ibuprofen (she was given Ibuprofen 800 mg in the hospital) if she had any left or any in her home she should not take this. The pt was advised to take Tylenol only for pain or fever. The pt was told the message would be routed to the [facility] office to contact her for an appt. If they did not call her to schedule an appt in a week she should try calling them to schedule an appt if she had difficulty scheduling her appt she should contact the [facility] nurse. The pt verbalized understanding. A message was routed to the [facility] office to please schedule the pt for an initial prenatal appt.   Taya Woods RN

## 2021-01-11 ENCOUNTER — TELEPHONE (OUTPATIENT)
Dept: FAMILY MEDICINE CLINIC | Age: 41
End: 2021-01-11

## 2021-01-11 NOTE — TELEPHONE ENCOUNTER
----- Message from Darío Callejas RN sent at 1/8/2021  9:32 AM EST -----  Regarding: Initial prenatal appt  Hello, would someone be able to assist the pt to schedule an initial prenatal appt? The pt is in her 2nd trimester. The pt has 6 children ranging in age from 23 yrs old to 1 yrs old. The pt has had 3 of the babies at 35 weeks. This she is concerned about happening again with this baby. She is having no abdominal pain or bleeding at this time.      Thank you,     Darío Callejas RN

## 2021-01-18 ENCOUNTER — TELEPHONE (OUTPATIENT)
Dept: FAMILY MEDICINE CLINIC | Age: 41
End: 2021-01-18

## 2021-01-18 NOTE — TELEPHONE ENCOUNTER
[phone]  [facility].

## 2021-01-22 NOTE — TELEPHONE ENCOUNTER
Call patient to see what she needs to be seen for and schedule accordingly prob as telemed/vv for first initial visit    Thanks  Marie Mkceon S/PSR  [facility] Referral Coordinator

## 2021-01-22 NOTE — TELEPHONE ENCOUNTER
----- Message from Sharlene Crowder sent at 1/18/2021  2:43 PM EST -----  Regarding: Unknown/telephone  Patient return call    Caller's first and last name and relationship (if not the patient): pt      Best contact number(s): [phone]      Whose call is being returned: Unknown      Details to clarify the request: Appt scheduling for new pt, calling back.  Requires       Sharlene Crowder

## 2021-01-26 ENCOUNTER — VIRTUAL VISIT (OUTPATIENT)
Dept: FAMILY MEDICINE CLINIC | Age: 41
End: 2021-01-26

## 2021-01-26 DIAGNOSIS — N92.6 MISSED MENSES: Primary | ICD-10-CM

## 2021-01-26 PROCEDURE — 99442 PR PHYS/QHP TELEPHONE EVALUATION 11-20 MIN: CPT | Performed by: STUDENT IN AN ORGANIZED HEALTH CARE EDUCATION/TRAINING PROGRAM

## 2021-01-26 NOTE — PROGRESS NOTES
Alexa Hatch  47 y.o. female  1980  [address]  [id number]    [phone] (home)      [facility] [address]:    Telephone Encounter  [place]       Encounter Date: 2021 at 4 PM    Consent: Alexa Hatch, who was seen by synchronous (real-time) audio only technology, and/or her healthcare decision maker, is aware that this patient-initiated, Telehealth encounter on 2021 is a billable service, with coverage as determined by her insurance carrier. She is aware that she may receive a bill and has provided verbal consent to proceed: Yes. Chief Complaint   Patient presents with    Missed Menses       History of Present Illness   Claire Brandon is a 47 y.o. female was evaluated by telephone. I communicated with the patient and/or health care decision maker about missed menses. Due to language barrier, an  was used with this patient - [employer]  [place]. 39yo [id number] at 14w2d (roughly based on LMP that patient states was around 10/18/2020). States all prior births were uncomplicated SVDs at [address] [place] [address]. Last baby 3 years ago. Although does state she had one  delivery and one baby that her feet were swollen and they had to do what they could to keep her from delivering  (she couldn't expand upon this). She is taking PNV. No hx of pre-eclampsia or gestational diabetes. No hx of STDs. No VB, LOF, or contractions. Review of Systems   Review of Systems   Constitutional: Negative for chills and fever. HENT: Negative for congestion. Respiratory: Negative for cough and shortness of breath. Cardiovascular: Negative for chest pain, palpitations and leg swelling. Gastrointestinal: Negative for abdominal pain, nausea and vomiting. Skin: Negative for rash. Vitals/Objective:   General: Patient speaking in complete sentences without effort. Normal speech and cooperative.        Due to this being a Virtual Check-in/Telephone evaluation, many elements of the physical examination are unable to be assessed. Assessment and Plan: Total Time: minutes: 11-20 minutes    1. Missed menses  at 14w2d by LMP (not exact dating). Reports only prior problems were  delivery. - sent message to Cesar Crowder to schedule IOB    Patient informed to follow up: 1 week for IOB; COVID screen neg    I affirm this is a Patient Initiated Episode with an Established Patient who has not had a related appointment within my department in the past 7 days or scheduled within the next 24 hours. Note: not billable if this call serves to triage the patient into an appointment for the relevant concern      Electronically Signed: Matt Sanchez DO  Providers location when delivering service: home        ICD-10-CM ICD-9-CM    1. Missed menses  N92.6 626.4        Pursuant to the emergency declaration under the [address] [place] [address] waiver authority and the [employer] Act, this Virtual  Visit was conducted, with patient's consent, to reduce the patient's risk of exposure to COVID-19 and provide continuity of care for an established patient. History   Patients past medical, surgical and family histories were personally reviewed and updated. No past medical history on file. No past surgical history on file. No family history on file. Social History     Tobacco Use    Smoking status: Never Smoker    Smokeless tobacco: Never Used   Substance Use Topics    Alcohol use: No    Drug use: Not on file              Current Medications/Allergies   Medications and Allergies reviewed:    Current Outpatient Medications   Medication Sig Dispense Refill    prenatal multivit-ca-min-fe-fa tab Take  by mouth.        No Known Allergies

## 2021-01-31 NOTE — PROGRESS NOTES
Subjective:   Sil Landis is a 47 y.o. female who is being seen today for her first obstetrical visit. This is not a planned pregnancy. She was taking OCP when she got pregnant, but is happy about the pregnancy    41yo  at 15w1d (roughly based on LMP that patient states was around 10/18/2020). States all prior births were SVDs at [facility]. Records obtained from [facility] indicate that she had a history of preE with severe range BP with last baby in 2017. Also has a history of  birth x 2 requiring [place] injections, h/o cholestasis in pregnancy, and pancreatitis.     ALBERT 21    She is taking PNV. No h/o gestational diabetes. No hx of STDs. No VB, LOF, or contractions. History of GDM or DM? no    History of GHTN or HTN? no    History of pre-eclampsia? yes    Relationship with FOB: spouse, living together. She is taking prenatal vitamins. Desires second trimester fetal anatomy ultrasound? yes    Desires second trimester screening for fetal anomalies? yes      Allergies- reviewed:   No Known Allergies      Medications- reviewed:   Current Outpatient Medications   Medication Sig    prenatal multivit-ca-min-fe-fa tab Take  by mouth. No current facility-administered medications for this visit. Past Medical History- reviewed:  No past medical history on file. Past Surgical History- reviewed:   No past surgical history on file.       Social History- reviewed:  Social History     Socioeconomic History    Marital status: SINGLE     Spouse name: Not on file    Number of children: Not on file    Years of education: Not on file    Highest education level: Not on file   Occupational History    Not on file   Social Needs    Financial resource strain: Not on file    Food insecurity     Worry: Not on file     Inability: Not on file    Transportation needs     Medical: Not on file     Non-medical: Not on file   Tobacco Use    Smoking status: Never Smoker    Smokeless tobacco: Never Used   Substance and Sexual Activity    Alcohol use: No    Drug use: Not on file    Sexual activity: Not on file   Lifestyle    Physical activity     Days per week: Not on file     Minutes per session: Not on file    Stress: Not on file   Relationships    Social connections     Talks on phone: Not on file     Gets together: Not on file     Attends Synagogue service: Not on file     Active member of club or organization: Not on file     Attends meetings of clubs or organizations: Not on file     Relationship status: Not on file    Intimate partner violence     Fear of current or ex partner: Not on file     Emotionally abused: Not on file     Physically abused: Not on file     Forced sexual activity: Not on file   Other Topics Concern    Not on file   Social History Narrative    Not on file         Immunizations- reviewed:   Immunization History   Administered Date(s) Administered    Influenza Vaccine [employer]) PF (>6 Mo Flulaval, Fluarix, and >3 Yrs Afluria, Fluzone 01877) 02/01/2021    Influenza Vaccine Intradermal PF 11/02/2017, 11/30/2018    Tdap 02/22/2015         ROS  : Denies vaginal bleeding and leaking of fluid  GI: Denies nausea and vomiting      Objective:     Visit Vitals  /63 (BP 1 Location: Right upper arm, BP Patient Position: Sitting)   Pulse 64   Temp 97.5 °F (36.4 °C) (Temporal)   Resp 18   Ht 4' 8.5\" (1.435 m)   Wt 134 lb (60.8 kg)   LMP 10/18/2020 (Exact Date)   SpO2 99%   Breastfeeding Yes   BMI 29.51 kg/m²       Physical Exam:  GENERAL APPEARANCE: alert, well appearing, in no apparent distress  HEAD: normocephalic, atraumatic   LUNGS: clear to auscultation, no wheezes, rales or rhonchi, symmetric air entry  HEART: regular rate and rhythm, no murmurs  ABDOMEN: FHT not heard  EXTERNAL GENITALIA: normal appearing vulva with no masses, tenderness or lesions  VAGINA: no abnormal discharge or lesions  CERVIX: no lesions or cervical motion tenderness  UTERUS: gravid  ADNEXA: no masses palpable and nontender  EXTREMITIES: no redness or tenderness in the calves or thighs, no edema  NEUROLOGICAL: alert, oriented, normal speech, no focal findings or movement disorder noted  SKIN: normal coloration and turgor, no rashes    Exam chaperoned by Everardo Slater RN    Labs:  Recent Results (from the past 12 hour(s))   AMB POC URINALYSIS DIP STICK AUTO W/O MICRO    Collection Time: 21 10:53 AM   Result Value Ref Range    Color (UA POC) Yellow     Clarity (UA POC) Clear     Glucose (UA POC) Negative Negative    Bilirubin (UA POC) Negative Negative    Ketones (UA POC) Negative Negative    Specific gravity (UA POC) 1.020 1.001 - 1.035    Blood (UA POC) Negative Negative    pH (UA POC) 7.5 4.6 - 8.0    Protein (UA POC) Negative Negative    Urobilinogen (UA POC) 0.2 mg/dL 0.2 - 1    Nitrites (UA POC) Negative Negative    Leukocyte esterase (UA POC) Negative Negative         Assessment:     [place] is a [id number] at 15w1d by LMP (not exact dating). Previous pregnancies complicated by  delivery and severe pre-eclampsia. ICD-10-CM ICD-9-CM    1. Prenatal care, antepartum  Z34.90 V22.1 AMB POC URINALYSIS DIP STICK AUTO W/O MICRO      INFLUENZA VIRUS VAC QUAD,SPLIT,PRESV FREE SYRINGE IM      NV IMMUNIZ ADMIN,1 SINGLE/COMB VAC/TOXOID      CBC W/O DIFF      TYPE & SCREEN      RUBELLA AB, IGG      HEP B SURF AG CONFIR      RPR      HIV 1/2 AG/AB, 4TH GENERATION,W RFLX CONFIRM      PAP IG, CT-NG-TV, APTIMA HPV AND RFX 78/26,16([id number])      VZV AB, IGG      HEMOGLOBIN FRACTIONATION      CULTURE, URINE      PAP IG, CT-NG-TV, APTIMA HPV AND RFX 52/04,85([id number])      CANCELED: PAP IG, APTIMA HPV AND RFX 16/18,45 ([id number])   2. History of severe pre-eclampsia  Z87.59 V13.29    3. History of  delivery  Z87.51 V13.21          Plan:   · Initial labs/specimens collected (CBC, hemoglobin fractionation.  blood type & screen w Rh antibody screen, rubella titer, HBsAg titer, RPR, HIV, UA w/ cx, pap smear, gonorrhea/chlamydia cx)  · Recommended prenatal vitamins  · 2nd trimester fetal anatomy ultrasound: fax to MFM at next apt. · NIPT denied. · Desires weekly Ava injections  · Follow-up in 4 week(s) for routine OB    H/o Severe pre-eclampsia per [facility] records  -  ASA 81mg daily    H/o  birth per [facility] records: Was getting [place] injections in previous pregnancies. Records were obtained after patient had left clinic.   - Patient would like to start weekly [place] injections.  (Patient would need to start at 16 weeks and need to COMMIT to weekly injections; reduces  risk by 1/3)     - Arrange M for cervical length screening at next apt    H/o cholestasis in pregnancy per [facility] records  - monitor for symptoms    H/o  per [facility] records  - monitor for symptoms        Orders Placed This Encounter    AK IMMUNIZ ADMIN,1 SINGLE/COMB VAC/TOXOID    CULTURE, URINE     Standing Status:   Future     Number of Occurrences:   1     Standing Expiration Date:   2022    INFLUENZA VIRUS VAC QUAD,SPLIT,PRESV FREE SYRINGE IM (Flulaval, Fluzone, Fluarix) ([id number])    CBC W/O DIFF     Standing Status:   Future     Number of Occurrences:   1     Standing Expiration Date:   2022    RUBELLA AB, IGG     Standing Status:   Future     Number of Occurrences:   1     Standing Expiration Date:   2022    HEP B SURF AG CONFIR ([facility] Only)     Standing Status:   Future     Number of Occurrences:   1     Standing Expiration Date:   2022    RPR     Standing Status:   Future     Number of Occurrences:   1     Standing Expiration Date:   2022    HIV 1/2 AG/AB, 4TH GENERATION,W RFLX CONFIRM     Standing Status:   Future     Number of Occurrences:   1     Standing Expiration Date:   2022    VARICELLA-ZOSTER V AB, IGG     Standing Status:   Future     Number of Occurrences:   1     Standing Expiration Date:   2022    HEMOGLOBIN FRACTIONATION     Standing Status:   Future     Number of Occurrences:   1     Standing Expiration Date:   2/1/2022    AMB POC URINALYSIS DIP STICK AUTO W/O MICRO    TYPE & SCREEN     ENTER SURGERY DATE IF FOR PRE-OP TESTING. Standing Status:   Future     Number of Occurrences:   1     Standing Expiration Date:   2/1/2022     Order Specific Question:   Has patient been transfused or pregnant in the last 3 mos. ? Answer:   Unknown    PAP IG, CT-NG-TV, APTIMA HPV AND RFX 76/66,29([id number])     Standing Status:   Future     Number of Occurrences:   1     Standing Expiration Date:   2/1/2022     Order Specific Question:   Pap Source? Answer:   Cervical     Order Specific Question:   Total Hysterectomy? Answer:   No     Order Specific Question:   Supracervical Hysterectomy? Answer:   No     Order Specific Question:   Post Menopausal?     Answer:   No     Order Specific Question:   Hormone Therapy? Answer:   No     Order Specific Question:   IUD? Answer:   No     Order Specific Question:   Abnormal Bleeding? Answer:   No     Order Specific Question:   Pregnant     Answer:   Yes     Order Specific Question:   Post Partum? Answer:   No         I have discussed the diagnosis with the patient and the intended plan as seen in the above orders. The patient verbalized understanding of the plan and agrees with the plan. The patient has received an after-visit summary and questions were answered concerning future plans. I have discussed medication side effects and warnings with the patient as well. Informed pt to return to the office or go to the ER if she experiences vaginal bleeding, vaginal discharge, leaking of fluid, pelvic cramping.       Pt seen and discussed with [place] (attending physician)      Nathaniel Orona MD  Family Medicine Resident

## 2021-02-01 ENCOUNTER — INITIAL PRENATAL (OUTPATIENT)
Dept: FAMILY MEDICINE CLINIC | Age: 41
End: 2021-02-01

## 2021-02-01 ENCOUNTER — HOSPITAL ENCOUNTER (OUTPATIENT)
Dept: LAB | Age: 41
Discharge: HOME | End: 2021-02-01

## 2021-02-01 VITALS
DIASTOLIC BLOOD PRESSURE: 63 MMHG | SYSTOLIC BLOOD PRESSURE: 103 MMHG | RESPIRATION RATE: 18 BRPM | TEMPERATURE: 97.5 F | BODY MASS INDEX: 28.91 KG/M2 | HEART RATE: 64 BPM | OXYGEN SATURATION: 99 % | HEIGHT: 57 IN | WEIGHT: 134 LBS

## 2021-02-01 DIAGNOSIS — Z87.59 HISTORY OF SEVERE PRE-ECLAMPSIA: ICD-10-CM

## 2021-02-01 DIAGNOSIS — Z34.90 PRENATAL CARE, ANTEPARTUM: ICD-10-CM

## 2021-02-01 DIAGNOSIS — Z34.90 PRENATAL CARE, ANTEPARTUM: Primary | ICD-10-CM

## 2021-02-01 DIAGNOSIS — Z87.51 HISTORY OF PRETERM DELIVERY: ICD-10-CM

## 2021-02-01 LAB
ABO + RH BLD: NORMAL
BILIRUB UR QL STRIP: NEGATIVE
BLOOD BANK CMNT PATIENT-IMP: NORMAL
BLOOD GROUP ANTIBODIES SERPL: NORMAL
GLUCOSE UR-MCNC: NEGATIVE MG/DL
KETONES P FAST UR STRIP-MCNC: NEGATIVE MG/DL
PH UR STRIP: 7.5 [PH] (ref 4.6–8)
PROT UR QL STRIP: NEGATIVE
SP GR UR STRIP: 1.02 (ref 1–1.03)
SPECIMEN EXP DATE BLD: NORMAL
UA UROBILINOGEN AMB POC: NORMAL (ref 0.2–1)
URINALYSIS CLARITY POC: CLEAR
URINALYSIS COLOR POC: YELLOW
URINE BLOOD POC: NEGATIVE
URINE LEUKOCYTES POC: NEGATIVE
URINE NITRITES POC: NEGATIVE

## 2021-02-01 PROCEDURE — 90471 IMMUNIZATION ADMIN: CPT | Performed by: STUDENT IN AN ORGANIZED HEALTH CARE EDUCATION/TRAINING PROGRAM

## 2021-02-01 PROCEDURE — 88175 CYTOPATH C/V AUTO FLUID REDO: CPT

## 2021-02-01 PROCEDURE — 90686 IIV4 VACC NO PRSV 0.5 ML IM: CPT | Performed by: STUDENT IN AN ORGANIZED HEALTH CARE EDUCATION/TRAINING PROGRAM

## 2021-02-01 PROCEDURE — 0500F INITIAL PRENATAL CARE VISIT: CPT | Performed by: STUDENT IN AN ORGANIZED HEALTH CARE EDUCATION/TRAINING PROGRAM

## 2021-02-01 PROCEDURE — 87624 HPV HI-RISK TYP POOLED RSLT: CPT

## 2021-02-01 PROCEDURE — 81003 URINALYSIS AUTO W/O SCOPE: CPT | Performed by: STUDENT IN AN ORGANIZED HEALTH CARE EDUCATION/TRAINING PROGRAM

## 2021-02-01 PROCEDURE — 87591 N.GONORRHOEAE DNA AMP PROB: CPT

## 2021-02-01 NOTE — PROGRESS NOTES
I reviewed with the resident the medical history and the resident's findings on the physical examination. I discussed with the resident the patient's diagnosis and concur with the plan. 39yo [id number] @ 15w1d by LMP  1. IUP: IOB labs, will ask if she wants NIPT   2. AMA  3.  Grand multip: consider prophylactic agent in labor   4. PTD: per [facility] records PTD x2 (36.0 and 36.7wk) was on Ava in last pregnancy. Resident to call pt and discuss option for [place] + CLS in this pregnancy. 5.  Hx severe preE: start ASA  6.   Hx cholestasis

## 2021-02-01 NOTE — PROGRESS NOTES
Identified Patient with two Patient identifiers (Name and ). Two Patient Identifiers confirmed. Reviewed record in preparation for visit and have obtained necessary documentation. Chief Complaint   Patient presents with    Initial Prenatal Visit     [id number] LMP 10/18/2020 ALBERT 2021 15w1d. Patient roomed with [facility]  #[id number]. Visit Vitals  /63 (BP 1 Location: Right upper arm, BP Patient Position: Sitting)   Pulse 64   Temp 97.5 °F (36.4 °C) (Temporal)   Resp 18   Ht 4' 8.5\" (1.435 m)   Wt 134 lb (60.8 kg)   SpO2 99%   Breastfeeding Yes   BMI 29.51 kg/m²       1. Have you been to the ER, urgent care clinic since your last visit? Hospitalized since your last visit? No    2. Have you seen or consulted any other health care providers outside of the [address] [place] [address] since your last visit? Include any pap smears or colon screening. No New patient. Patient denies any vaginal bleeding, LOF, or abnormal d/c. No fetal movements felt yet.

## 2021-02-02 LAB
BACTERIA SPEC CULT: NORMAL
HBV SURFACE AG SER QL: <0.1 INDEX
HBV SURFACE AG SER QL: NEGATIVE
SERVICE CMNT-IMP: NORMAL

## 2021-02-03 LAB
DEPRECATED HGB OTHER BLD-IMP: 0 %
ERYTHROCYTE [DISTWIDTH] IN BLOOD BY AUTOMATED COUNT: 13.5 % (ref 11.5–14.5)
HCT VFR BLD AUTO: 41.2 % (ref 35–47)
HGB A MFR BLD: 97.6 % (ref 96.4–98.8)
HGB A2 MFR BLD COLUMN CHROM: 2.4 % (ref 1.8–3.2)
HGB BLD-MCNC: 13.1 G/DL (ref 11.5–16)
HGB C MFR BLD: 0 %
HGB F MFR BLD: 0 % (ref 0–2)
HGB FRACT BLD-IMP: NORMAL
HGB S BLD QL SOLY: NEGATIVE
HGB S MFR BLD: 0 %
HIV 1+2 AB+HIV1 P24 AG SERPL QL IA: NONREACTIVE
HIV12 RESULT COMMENT, HHIVC: NORMAL
MCH RBC QN AUTO: 29.8 PG (ref 26–34)
MCHC RBC AUTO-ENTMCNC: 31.8 G/DL (ref 30–36.5)
MCV RBC AUTO: 93.8 FL (ref 80–99)
NRBC # BLD: 0 K/UL (ref 0–0.01)
NRBC BLD-RTO: 0 PER 100 WBC
PLATELET # BLD AUTO: 264 K/UL (ref 150–400)
PMV BLD AUTO: 12 FL (ref 8.9–12.9)
RBC # BLD AUTO: 4.39 M/UL (ref 3.8–5.2)
RPR SER QL: NONREACTIVE
RUBV IGG SER-IMP: REACTIVE
RUBV IGG SERPL IA-ACNC: 15.3 IU/ML
VZV IGG SER IA-ACNC: 249 INDEX
WBC # BLD AUTO: 8.8 K/UL (ref 3.6–11)

## 2021-02-04 ENCOUNTER — TELEPHONE (OUTPATIENT)
Dept: FAMILY MEDICINE CLINIC | Age: 41
End: 2021-02-04

## 2021-02-04 LAB
C TRACH RRNA SPEC QL NAA+PROBE: NEGATIVE
N GONORRHOEA RRNA SPEC QL NAA+PROBE: NEGATIVE
SPECIMEN SOURCE: NORMAL
T VAGINALIS RRNA SPEC QL NAA+PROBE: NEGATIVE

## 2021-02-04 NOTE — TELEPHONE ENCOUNTER
Called patient with [facility]  to schedule appointment for patient next week. She would like to start pepito injections.

## 2021-02-08 NOTE — PROGRESS NOTES
O+, Ab neg, Rubella immune, Varicella immune, HIV NR, CBC WNL, RPR NR, HepBsAg neg, hemoglobin fractionation WNL

## 2021-02-10 ENCOUNTER — ROUTINE PRENATAL (OUTPATIENT)
Dept: FAMILY MEDICINE CLINIC | Age: 41
End: 2021-02-10

## 2021-02-10 VITALS
OXYGEN SATURATION: 98 % | HEART RATE: 63 BPM | WEIGHT: 136 LBS | TEMPERATURE: 97 F | SYSTOLIC BLOOD PRESSURE: 104 MMHG | DIASTOLIC BLOOD PRESSURE: 64 MMHG | BODY MASS INDEX: 29.95 KG/M2 | RESPIRATION RATE: 16 BRPM

## 2021-02-10 DIAGNOSIS — O09.90 SUPERVISION OF HIGH RISK PREGNANCY, ANTEPARTUM: Primary | ICD-10-CM

## 2021-02-10 DIAGNOSIS — O09.529 ANTEPARTUM MULTIGRAVIDA OF ADVANCED MATERNAL AGE: ICD-10-CM

## 2021-02-10 DIAGNOSIS — Z87.51 HISTORY OF PRETERM DELIVERY: ICD-10-CM

## 2021-02-10 DIAGNOSIS — Z87.59 HISTORY OF SEVERE PRE-ECLAMPSIA: ICD-10-CM

## 2021-02-10 DIAGNOSIS — Z64.1 GRAND MULTIPARITY: ICD-10-CM

## 2021-02-10 DIAGNOSIS — Z34.90 PRENATAL CARE, ANTEPARTUM: ICD-10-CM

## 2021-02-10 LAB
BILIRUB UR QL STRIP: NEGATIVE
GLUCOSE UR-MCNC: NEGATIVE MG/DL
KETONES P FAST UR STRIP-MCNC: NEGATIVE MG/DL
PH UR STRIP: 7 [PH] (ref 4.6–8)
PROT UR QL STRIP: NEGATIVE
SP GR UR STRIP: 1.02 (ref 1–1.03)
UA UROBILINOGEN AMB POC: NORMAL (ref 0.2–1)
URINALYSIS CLARITY POC: CLEAR
URINALYSIS COLOR POC: YELLOW
URINE BLOOD POC: NEGATIVE
URINE LEUKOCYTES POC: NEGATIVE
URINE NITRITES POC: NEGATIVE

## 2021-02-10 PROCEDURE — 81003 URINALYSIS AUTO W/O SCOPE: CPT | Performed by: FAMILY MEDICINE

## 2021-02-10 PROCEDURE — 0502F SUBSEQUENT PRENATAL CARE: CPT | Performed by: FAMILY MEDICINE

## 2021-02-10 RX ORDER — HYDROXYPROGESTERONE CAPROATE 250 MG/ML
250 INJECTION INTRAMUSCULAR ONCE
Qty: 1 VIAL | Refills: 0 | Status: SHIPPED | COMMUNITY
Start: 2021-02-10 | End: 2021-02-10

## 2021-02-10 NOTE — PROGRESS NOTES
Chief Complaint   Patient presents with    Routine Prenatal Visit       Patient identified with 2 patient identifiers (name and D. O. B)    Patient is a  at 16w3d    Leakage of Fluid: NO  Vaginal Bleeding: NO  Fetal Movement: YES  Prenatal vitamins: YES  Having Contractions: NO  Pain: NO    Visit Vitals  LMP 10/18/2020 (Exact Date)       Immunization History   Administered Date(s) Administered    Influenza Vaccine [employer]) PF (>6 Mo Flulaval, Fluarix, and >3 Yrs Afluria, Fluzone 31556) 2021    Influenza Vaccine Intradermal PF 2017, 2018    Tdap 2015

## 2021-02-10 NOTE — PROGRESS NOTES
I reviewed with the resident the medical history and the resident's findings on the physical examination. I discussed with the resident the patient's diagnosis and concur with the plan. 39yo [id number] @ 16w3d by LMP  1. IUP: IOB labs, NIPT declined  2. AMA  3.  Grand multip: consider prophylactic agent in labor   4. PTD: per [facility] records PTD x2 (36.0 and 36.7wk) was on [place] in last pregnancy. Have counseled about progesterone and desires so will start today, send to [facility] for CLS. 5.  Hx severe preE: start ASA  6.   Hx cholestasis

## 2021-02-15 ENCOUNTER — TELEPHONE (OUTPATIENT)
Dept: FAMILY MEDICINE CLINIC | Age: 41
End: 2021-02-15

## 2021-02-17 ENCOUNTER — ROUTINE PRENATAL (OUTPATIENT)
Dept: FAMILY MEDICINE CLINIC | Age: 41
End: 2021-02-17

## 2021-02-17 ENCOUNTER — TELEPHONE (OUTPATIENT)
Dept: FAMILY MEDICINE CLINIC | Age: 41
End: 2021-02-17

## 2021-02-17 VITALS
OXYGEN SATURATION: 100 % | RESPIRATION RATE: 16 BRPM | TEMPERATURE: 98.3 F | HEART RATE: 59 BPM | SYSTOLIC BLOOD PRESSURE: 99 MMHG | DIASTOLIC BLOOD PRESSURE: 55 MMHG | HEIGHT: 57 IN | WEIGHT: 137 LBS | BODY MASS INDEX: 29.56 KG/M2

## 2021-02-17 DIAGNOSIS — Z34.92 PRENATAL CARE IN SECOND TRIMESTER: Primary | ICD-10-CM

## 2021-02-17 LAB
BILIRUB UR QL STRIP: NEGATIVE
GLUCOSE UR-MCNC: NEGATIVE MG/DL
KETONES P FAST UR STRIP-MCNC: NEGATIVE MG/DL
PH UR STRIP: 7 [PH] (ref 4.6–8)
PROT UR QL STRIP: NEGATIVE
SP GR UR STRIP: 1.02 (ref 1–1.03)
UA UROBILINOGEN AMB POC: NORMAL (ref 0.2–1)
URINALYSIS CLARITY POC: NORMAL
URINALYSIS COLOR POC: YELLOW
URINE BLOOD POC: NORMAL
URINE LEUKOCYTES POC: NEGATIVE
URINE NITRITES POC: NEGATIVE

## 2021-02-17 PROCEDURE — 0502F SUBSEQUENT PRENATAL CARE: CPT | Performed by: STUDENT IN AN ORGANIZED HEALTH CARE EDUCATION/TRAINING PROGRAM

## 2021-02-17 PROCEDURE — 81003 URINALYSIS AUTO W/O SCOPE: CPT | Performed by: STUDENT IN AN ORGANIZED HEALTH CARE EDUCATION/TRAINING PROGRAM

## 2021-02-17 RX ORDER — HYDROXYPROGESTERONE CAPROATE 250 MG/ML
250 INJECTION INTRAMUSCULAR ONCE
Qty: 1 VIAL | Refills: 0 | Status: SHIPPED | COMMUNITY
Start: 2021-02-17 | End: 2021-02-17

## 2021-02-17 NOTE — PROGRESS NOTES
I reviewed with the resident the medical history and the resident's findings on the physical examination. I discussed with the resident the patient's diagnosis and concur with the plan. 39yo [id number] @ 17w3d by LMP  1. IUP: IOB labs, NIPT declined  2. AMA  3.  Grand multip: consider prophylactic agent in labor   4. PTD: per [facility] records PTD x2 (36.0 and 36.7wk) was on [place] in last pregnancy and desired again this pregnancy after counseling. MFM for CLS -2/18  5. Hx severe preE: start ASA  6.   Hx cholestasis

## 2021-02-17 NOTE — TELEPHONE ENCOUNTER
TORY GARCIA [facility] specialty pharmacy to confirm shipment. Ava injections to be delivered to office Friday 2/19/2021. Pharmacy tried calling office yesterday to set up shipment but could not get through to anyone.

## 2021-02-17 NOTE — PROGRESS NOTES
Chief Complaint   Patient presents with    Routine Prenatal Visit     .  17w 3d today. No bleeding or LOF. No FM yet. 1. Have you been to the ER, urgent care clinic since your last visit? Hospitalized since your last visit? No    2. Have you seen or consulted any other health care providers outside of the [address] [place] [address] since your last visit? Include any pap smears or colon screening.  No

## 2021-02-17 NOTE — PROGRESS NOTES
Return OB Visit       Subjective:   Vera Slider 47 y.o. Michele Constant  at [facility] MOLLY FRY:  17w3d by LMP ALBERT: 7/25/2021,  who presents today for her routine prenatal visit. Pregnancy complicated by AMA, grand multip, PTD x2 (36.0 and 36.7wk) on pepito, Hx severe PreE, Hx of cholestasis. Taking prenatal vitamins and ASA. ROS:   She is notfeeling her baby move. She denies vaginal bleeding, discharge or loss of fluid. She denies nausea, vomiting, severe abdominal pain or cramping. She denies dysuria. She denies itching  She denies headaches, dizziness or vision changes. She denies excessive swelling of extremities      Allergies- reviewed:   No Known Allergies  Medications- reviewed:   Current Outpatient Medications   Medication Sig    HYDROXYprogesterone caproate 250 mg/mL oil injection 250 mg by IntraMUSCular route once for 1 dose.  prenatal multivit-ca-min-fe-fa tab Take  by mouth. No current facility-administered medications for this visit. Past Medical History- reviewed:  No past medical history on file. Past Surgical History- reviewed:   No past surgical history on file.   Social History- reviewed:  Social History     Socioeconomic History    Marital status: SINGLE     Spouse name: Not on file    Number of children: Not on file    Years of education: Not on file    Highest education level: Not on file   Occupational History    Not on file   Social Needs    Financial resource strain: Not on file    Food insecurity     Worry: Not on file     Inability: Not on file    Transportation needs     Medical: Not on file     Non-medical: Not on file   Tobacco Use    Smoking status: Never Smoker    Smokeless tobacco: Never Used   Substance and Sexual Activity    Alcohol use: No    Drug use: Not on file    Sexual activity: Not on file   Lifestyle    Physical activity     Days per week: Not on file     Minutes per session: Not on file    Stress: Not on file   Relationships    Social connections     Talks on phone: Not on file     Gets together: Not on file     Attends [employer] service: Not on file     Active member of club or organization: Not on file     Attends meetings of clubs or organizations: Not on file     Relationship status: Not on file    Intimate partner violence     Fear of current or ex partner: Not on file     Emotionally abused: Not on file     Physically abused: Not on file     Forced sexual activity: Not on file   Other Topics Concern    Not on file   Social History Narrative    Not on file     Immunizations- reviewed:   Immunization History   Administered Date(s) Administered    Influenza Vaccine [employer]) PF (>6 Mo Flulaval, Fluarix, and >3 Yrs Afluria, Fluzone 07598) 02/01/2021    Influenza Vaccine Intradermal PF 11/02/2017, 11/30/2018    Tdap 02/22/2015         Objective:     Visit Vitals  BP (!) 99/55   Pulse (!) 59   Temp 98.3 °F (36.8 °C) (Temporal)   Resp 16   Ht 4' 8.5\" (1.435 m)   Wt 137 lb (62.1 kg)   LMP 10/18/2020 (Exact Date)   SpO2 100%   BMI 30.17 kg/m²       Physical Exam:  GENERAL APPEARANCE: alert, well appearing, in no apparent distress  ABDOMEN: FHT present, 130 bpm  BACK: no CVA tenderness  UTERUS: gravid, at level of umbilicus  EXTREMITIES: no redness or tenderness in the calves or thighs, no edema  NEUROLOGICAL: alert, oriented, normal speech, no focal findings or movement disorder noted  PELVIC: examination not indicated.     Labs  Recent Results (from the past 12 hour(s))   AMB POC URINALYSIS DIP STICK AUTO W/O MICRO    Collection Time: 02/17/21 10:05 AM   Result Value Ref Range    Color (UA POC) Yellow     Clarity (UA POC) Slightly Cloudy     Glucose (UA POC) Negative Negative    Bilirubin (UA POC) Negative Negative    Ketones (UA POC) Negative Negative    Specific gravity (UA POC) 1.025 1.001 - 1.035    Blood (UA POC) Trace Negative    pH (UA POC) 7.0 4.6 - 8.0    Protein (UA POC) Negative Negative    Urobilinogen (UA POC) 0.2 mg/dL 0.2 - 1 Nitrites (UA POC) Negative Negative    Leukocyte esterase (UA POC) Negative Negative         Assessment         ICD-10-CM ICD-9-CM    1. Prenatal care in second trimester  Z34.92 V22.1 AMB POC URINALYSIS DIP STICK AUTO W/O MICRO      THER/PROPH/DIAG INJECTION, SUBCUT/IM         Plan   47 y.o. Ramona Ghosh  17w3d by LMP ALBERT 2021, Date entered prior to episode creation here for return OB visit  Pregnancy complicated by AMA, grand multip, PTD x2 (36.0 and 36.7wk) on pepito, Hx severe PreE, Hx of cholestasis. SIUP  - IOL: Hep neg, hgb 13.1, O pos, antibody neg, rubella/varicella immune, RPR NR, HIV NR, hgb fractionation wnl, chlamydia, gonorrhea and trich negative, urine culture with no growth. PAP NILM hpv neg on 21. · 2nd trimester fetal anatomy ultrasound tomorrow    · NIPT denied    H/o Severe pre-eclampsia per [facility] records  -  ASA 81mg daily  - Patient was advised to start monitoring BP at home and keeping log.     H/o  birth per [facility] records: Was getting [place] injections in previous pregnancies. Records were obtained after patient had left clinic.   - Patient on weekly Pepito injections.      -  M appointment tomorrow for cervical length screening     H/o cholestasis in pregnancy per [facility] records  - monitor for symptoms     H/o  per [facility] records  - monitor for symptoms     Orders Placed This Encounter    THER/PROPH/DIAG INJECTION, SUBCUT/IM    AMB POC URINALYSIS DIP STICK AUTO W/O MICRO    HYDROXYprogesterone caproate 250 mg/mL oil injection     Si mg by IntraMUSCular route once for 1 dose. Dispense:  1 Vial     Refill:  0     Labor precautions discussed, including: Regular painful contractions, lasting for greater than one hour, taking your breath away; any vaginal bleeding; any leakage of fluid; or absent or decreased fetal movement. Call M.D. on call if any of these symptoms or signs occur.     I have discussed the diagnosis with the patient and the intended plan as seen in the above orders. The patient has received an after-visit summary and questions were answered concerning future plans. I have discussed medication side effects and warnings with the patient as well. Informed pt to return to the office or go to the ER if she experiences vaginal bleeding, vaginal discharge, leaking of fluid, pelvic cramping.     Pt seen and discussed with Dr. Vicki Colmenares (attending physician)    Ashli Howell MD  Family Medicine Resident

## 2021-02-17 NOTE — PATIENT INSTRUCTIONS
Semanas 14 a 18 de rowell embarazo: Instrucciones de cuidado Weeks 14 to 18 of Your Pregnancy: Care Instructions Instrucciones de cuidado [employer], es posible que se le empiece a notar que está [place]. También podría observar algunos cambios en la piel, ascencion picazón en algunas zonas de las deven de las tammie o acné en la rafael. Rozella Silas, rowell bebé puede orinar y alberto primeras heces (meconio) comienzan a acumularse en el intestino. Kg Macho a crecerle el debbi en la breanna. En rowell próxima visita, [employer] 18 y 21, rowell médico podría hacerle miryam ecografía. La prueba le permite al médico verificar si hay ciertos problemas. Rowell médico también puede determinar el sexo de rowell bebé. Radha es un buen momento para pensar si Ferd Robles si rowell bebé es Thresa Landsberg. Hable con rowell médico acerca de ponerse la vacuna contra la gripe para ayudar a mantenerse carly florence el embarazo. Con el transcurrir del Reinagonzález Knowles, es común sentirse preocupada o ansiosa. Rowell cuerpo está [employer] Y usted está pensando en artem a talya, en la natividad de rowell bebé y en convertirse en madre. Puede aprender a sobrellevar la ansiedad y el estrés que siente. La atención de seguimiento es miryam parte clave de rowell tratamiento y seguridad. Asegúrese de hacer y acudir a todas las citas, y llame a rowell médico si está teniendo problemas. También es miryam buena idea saber los resultados de alberto exámenes y mantener miryam lista de los medicamentos que paresh. Cómo puede cuidarse en el hogar? Reduzca el estrés 
  · Pida ayuda para cocinar y hacer los quehaceres domésticos.  
  · Entienda quién o qué le provoca estrés. Evite a estas personas o situaciones tanto ascencion le sea posible.  
  · Relájese todos los días. Tomarse descansos de 10 a 15 minutos puede hacerle sentir miryam gran diferencia. Camine, escuche música o tome un baño tibio.   · [place] clases de yoga o educación prenatal para aprender técnicas de relajación. También puede comprar un disco compacto de relajación.  
  · Favian miryam lista de alberto temores acerca de tener el bebé y ser [place]. Comparta la lista con alguien de rowell confianza. Laura Campuzano inquietudes son verdaderamente pequeñas, y trate de deshacerse de ellas. Ejercicio 
  · Si no hizo mucho ejercicio antes del embarazo, comience poco a poco. Lo mejor es caminar. Regule rowell ritmo y favian un poco más cada día.  
  · La caminata rápida, el trote lento, los ejercicios aeróbicos de bajo impacto, los ejercicios aeróbicos en el agua y el yoga son Ozella Melvin opciones. Algunos deportes, ascencion el buceo, la equitación, el esquí Ofe, la gimnasia y el esquí acuático no son Chuck Ohm idea.  
  · Trate de hacer por lo menos 2½ horas de ejercicio moderado a la semana, ascencion, por ejemplo, miryam caminata rápida. Jearldine Aloe de hacer esto es estar activo 30 minutos al día, por lo menos 5 días de la [place]. Está elsa estar activo en bloques de 10 minutos o más minerva el día y la semana.  
  · Use ropa holgada. Use zapatos y un sostén que le proporcionen un buen soporte.  
  · Erven Baseman de calentamiento y enfriamiento para comenzar y finalizar alberto ejercicios.  
  · Si desea usar pesas, asegúrese de que lenka livianas. Estas reducen la tensión en las articulaciones. Manténgase en el peso ideal para usted 
  · Los expertos recomiendan el aumento de 1 cici (medio kilo) al mes minerva los 3 primeros meses del embarazo.  
  · También recomiendan aumentar 1 cici a la [employer] 6 últimos meses del [facility], para aumentar de 25 a 35 libras (11 a 16 kg) en total.  
  · Si está por debajo del peso recomendable para usted, necesitará aumentar más, de 28 a 40 libras (13 a 18 kg) aproximadamente.  
  · Si tiene sobrepeso, quizás no deba aumentar tanto de [place], de 15 a 25 libras (7 a 11 kg) aproximadamente.   · Si está subiendo de Eddy Vaughn, use rowell sentido común. [address] [employer] [place] [address], y [place], la comida rápida y [employer] Sara Comings, frutas y verduras.  
  · Si va a tener gemelos o más bebés, es posible que rowell médico la remita a un dietista. Dónde puede encontrar más información en inglés? Pete Winston a http://[email or web address].[email or web address]/ Orcristine Lira T802 en la búsqueda para aprender más acerca de \"Semanas 14 a 18 de rowell embarazo: Instrucciones de cuidado. \" Revisado: 11 febrero, 2020               Versión del contenido: 12.6 © 0846-3258 Healthwise, Incorporated. Las instrucciones de cuidado fueron adaptadas bajo licencia por [employer] (which disclaims liability or warranty for this information). Si usted tiene [place] afección médica o sobre estas instrucciones, siempre pregunte a rowell profesional de natividad. Healthwise, Incorporated niega toda garantía o responsabilidad por rowell uso de esta información.

## 2021-02-18 ENCOUNTER — HOSPITAL ENCOUNTER (OUTPATIENT)
Dept: PERINATAL CARE | Age: 41
Discharge: HOME | End: 2021-02-18
Attending: OBSTETRICS & GYNECOLOGY

## 2021-02-18 PROCEDURE — 76817 TRANSVAGINAL US OBSTETRIC: CPT | Performed by: OBSTETRICS & GYNECOLOGY

## 2021-02-18 PROCEDURE — 76811 OB US DETAILED SNGL FETUS: CPT | Performed by: OBSTETRICS & GYNECOLOGY

## 2021-02-24 NOTE — TELEPHONE ENCOUNTER
----- Message from April Chahal sent at 2/15/2021  4:31 PM EST -----  General Message/Vendor Calls    Caller's first and last name: Joaquina harris      Reason for call: Mindi White would like Tommy Arroyo to call him back in regards to the pt      Callback required yes/no and why: yes      Best contact number(s): [phone]  ex: [phone]      Details to clarify the request: N/A      April Chahal

## 2021-02-24 NOTE — TELEPHONE ENCOUNTER
This message is from 2/15/21. We have already received this patients pepito injections. I called to set up shipment two weeks ago.

## 2021-02-25 NOTE — PROGRESS NOTES
Estimated Date of Delivery: 21    Two  deliveries, on pepito  Cervical length normal  F/u 8 weeks for fetal growth

## 2021-03-01 ENCOUNTER — ROUTINE PRENATAL (OUTPATIENT)
Dept: FAMILY MEDICINE CLINIC | Age: 41
End: 2021-03-01

## 2021-03-01 VITALS
HEART RATE: 65 BPM | HEIGHT: 57 IN | WEIGHT: 140.4 LBS | TEMPERATURE: 97.4 F | SYSTOLIC BLOOD PRESSURE: 93 MMHG | RESPIRATION RATE: 18 BRPM | BODY MASS INDEX: 30.29 KG/M2 | DIASTOLIC BLOOD PRESSURE: 57 MMHG

## 2021-03-01 DIAGNOSIS — O09.521 MULTIGRAVIDA OF ADVANCED MATERNAL AGE IN FIRST TRIMESTER: ICD-10-CM

## 2021-03-01 DIAGNOSIS — Z3A.19 19 WEEKS GESTATION OF PREGNANCY: Primary | ICD-10-CM

## 2021-03-01 PROCEDURE — 0502F SUBSEQUENT PRENATAL CARE: CPT | Performed by: STUDENT IN AN ORGANIZED HEALTH CARE EDUCATION/TRAINING PROGRAM

## 2021-03-01 RX ORDER — ASPIRIN 81 MG/1
81 TABLET ORAL DAILY
Qty: 30 TAB | Refills: 2 | Status: SHIPPED | OUTPATIENT
Start: 2021-03-01 | End: 2021-03-01 | Stop reason: CLARIF

## 2021-03-01 NOTE — PROGRESS NOTES
Return OB Visit       Subjective:   Pavel Dhillon 47 y.o. [place] La   ALBERT: 2021, Date entered prior to episode creation  GA:  19w1d. LOF: NO  Vaginal bleeding: NO  Fetal movement (after 20 weeks): Not Yet  Contractions: NO    Pt denies fever, chills, HA, vision disturbances, RUQ pain, chest pain, SOB, N/V/D, constipation, urinary problems, foul smelling vaginal discharge, LE Edema worse than usual.    Pregnancy complicated by AMA, grand multip, PTD x2 (36.0 and 36.7wk) on pepito, Hx severe PreE, Hx of cholestasis. No GDM or DM. OB History    Para Term  AB Living   7 6 4 2 0 6   SAB TAB Ectopic Molar Multiple Live Births                    # Outcome Date GA Lbr Joe/2nd Weight Sex Delivery Anes PTL Lv   7 Current            6 Term 17 39w0d  6 lb (2.722 kg) M Vag-Spont      5  03/31/15 36w5d  5 lb 15.2 oz (2.7 kg) F Vag-Spont      4  10/26/07 36w0d  6 lb 6.4 oz (2.903 kg) M Vag-Spont      3 Term 05 40w0d  7 lb 1.6 oz (3.221 kg) F Vag-Spont      2 Term 03 40w0d  6 lb 3.2 oz (2.812 kg) F Vag-Spont      1 Term 01 40w0d  7 lb 14.4 oz (3.583 kg) M Vag-Spont         Obstetric Comments   Pt states she had one  delivery in 10/2007 @ 35 weeks. Allergies- reviewed:   No Known Allergies  Medications- reviewed:   Current Outpatient Medications   Medication Sig    prenatal multivit-ca-min-fe-fa tab Take  by mouth. No current facility-administered medications for this visit. Past Medical History- reviewed:  No past medical history on file. Past Surgical History- reviewed:   No past surgical history on file.   Social History- reviewed:  Social History     Socioeconomic History    Marital status: SINGLE     Spouse name: Not on file    Number of children: Not on file    Years of education: Not on file    Highest education level: Not on file   Occupational History    Not on file   Social Needs    Financial resource strain: Not on file  Food insecurity     Worry: Not on file     Inability: Not on file    Transportation needs     Medical: Not on file     Non-medical: Not on file   Tobacco Use    Smoking status: Never Smoker    Smokeless tobacco: Never Used   Substance and Sexual Activity    Alcohol use: No    Drug use: Not on file    Sexual activity: Not on file   Lifestyle    Physical activity     Days per week: Not on file     Minutes per session: Not on file    Stress: Not on file   Relationships    Social connections     Talks on phone: Not on file     Gets together: Not on file     Attends Christianity service: Not on file     Active member of club or organization: Not on file     Attends meetings of clubs or organizations: Not on file     Relationship status: Not on file    Intimate partner violence     Fear of current or ex partner: Not on file     Emotionally abused: Not on file     Physically abused: Not on file     Forced sexual activity: Not on file   Other Topics Concern    Not on file   Social History Narrative    Not on file     Immunizations- reviewed:   Immunization History   Administered Date(s) Administered    Influenza Vaccine [place]) PF (>6 Mo Flulaval, Fluarix, and >3 Yrs Afluria, Fluzone 36375) 02/01/2021    Influenza Vaccine Intradermal PF 11/02/2017, 11/30/2018    Tdap 02/22/2015     Flu vaccine Done  Tdap in 8 weeks    Objective:     Visit Vitals  BP (!) 93/57 (BP 1 Location: Right upper arm, BP Patient Position: Sitting, BP Cuff Size: Large adult)   Pulse 65   Temp 97.4 °F (36.3 °C) (Temporal)   Resp 18   Ht 4' 8.5\" (1.435 m)   Wt 140 lb 6.4 oz (63.7 kg)   LMP 10/18/2020 (Exact Date)   BMI 30.92 kg/m²       Physical Exam:  GENERAL APPEARANCE: alert, well appearing, in no apparent distress  ABDOMEN: gravid, Fundal height 19 FHT present at 135  bpm  PSYCH: normal mood and affect     Labs  Recent Results (from the past 12 hour(s))   AMB POC URINALYSIS DIP STICK AUTO W/O MICRO    Collection Time: 03/01/21 10:17 AM   Result Value Ref Range    Color (UA POC) Yellow     Clarity (UA POC) Slightly Cloudy     Glucose (UA POC) Negative Negative    Bilirubin (UA POC) Negative Negative    Ketones (UA POC) Negative Negative    Specific gravity (UA POC) 1.025 1.001 - 1.035    Blood (UA POC) Trace Negative    pH (UA POC) 7.0 4.6 - 8.0    Protein (UA POC) Negative Negative    Urobilinogen (UA POC) 1 mg/dL 0.2 - 1    Nitrites (UA POC) Negative Negative    Leukocyte esterase (UA POC) Negative Negative         Assessment         ICD-10-CM ICD-9-CM    1. 19 weeks gestation of pregnancy  Z3A.19 V22.2 AMB POC URINALYSIS DIP STICK AUTO W/O MICRO   2. Multigravida of advanced maternal age in first trimester  O09.521 65.56 DISCONTINUED: aspirin delayed-release 81 mg tablet         Plan   47 y.o. Mary Ann Tineo  19w1d ALBERT 2021, Date entered prior to episode creation here for return OB visit     1. SIUP at 19w1d  -PNL: Hep neg, hgb 13.1, O pos, antibody neg, rubella/varicella immune, RPR NR, HIV NR, hgb fractionation wnl, chlamydia, gonorrhea and trich negative, urine culture with no growth. PAP NILM hpv neg on 21.  -Genetic testing: Declined  -Immunizations: S/p Flu  -Ultrasound: EFW appropriate. Cervical Length Normal (h/o two  deliveries, on [place])    H/o Severe pre-eclampsia per [facility] records  - ASA 81mg daily  - Patient was advised to start monitoring BP at home and keeping log.     H/o  birth per [facility] records: Was getting Ava injections in previous pregnancies. Records were obtained after patient had left clinic.   - Patient on weekly Ava injections, will receive today and weekly appointments made for the next 3 weeks until follow up in 4 weeks.     H/o cholestasis in pregnancy per [facility] records  - monitor for symptoms    RTC in 4 weeks    BIRTH PLAN  · Pain mgmt. in labor: Has not decided  · Feeding plan: Bottle  · Social: Denies Tobacco, EtoH or illict drugs.         Orders Placed This Encounter    AMB POC URINALYSIS DIP STICK AUTO W/O MICRO    DISCONTD: aspirin delayed-release 81 mg tablet     Sig: Take 1 Tab by mouth daily. Dispense:  30 Tab     Refill:  2     Labor precautions discussed, including: Regular painful contractions, lasting for greater than one hour, taking your breath away; any vaginal bleeding; any leakage of fluid; or absent or decreased fetal movement. Call M.D. on call if any of these symptoms or signs occur. I have discussed the diagnosis with the patient and the intended plan as seen in the above orders. The patient has received an after-visit summary and questions were answered concerning future plans. I have discussed medication side effects and warnings with the patient as well. Informed pt to return to the office or go to the ER if she experiences vaginal bleeding, vaginal discharge, leaking of fluid, pelvic cramping.     Pt seen and discussed with Dr. Rosie Greco (attending physician)    Kane Carpio MD  Family Medicine Resident

## 2021-03-01 NOTE — PROGRESS NOTES
I reviewed with the resident the medical history and the resident's findings on the physical examination. I discussed with the resident the patient's diagnosis and concur with the plan. 41yo [id number] @ 19w1d by LMP  1. IUP: RH pos, NIPT declined  2. AMA  3.  Grand multip: consider prophylactic agent in labor   4. PTD: per [facility] records PTD x2 (36.0 and 36.7wk), on [place]. MFM for CLS - normal so far, rescan with anatomy   5. Hx severe preE: ASA  6.   Hx cholestasis

## 2021-03-01 NOTE — PATIENT INSTRUCTIONS
Semanas 18 a 22 de rowell embarazo: Instrucciones de cuidado  Weeks 18 to 22 of Your Pregnancy: Care Instructions  Instrucciones de cuidado    Rowell bebé continúa desarrollándose rápidamente. En esta etapa, los bebés ya pueden chuparse el pulgar, agarrar firmemente con las [place], y abrir y cerrar los párpados. En algún Vance's 18 y 25, comenzará a sentir que el bebé se [place]. Al principio, estos pequeños movimientos se sentirán ascencion un aleteo o un vuelo de mariposas. Algunas mujeres dicen que sienten ascencion burbujas de [place]. A medida que el bebé crece, estos movimientos serán más urbano. También podría observar que rowell bebé patea y tiene hipo. Minerva catalina Kalyani, podría descubrir que las náuseas y la fatiga desaparecieron. En general, es posible que se sienta mejor y tenga más energía que la que tenía minerva el primer trimestre. Sin embargo, también podría tener nuevas ANDOVER, ascencion problemas para dormir o calambres en las piernas. Esta hoja de cuidados la ayudará a aliviar esas molestias. La atención de seguimiento es miryam parte clave de rowell tratamiento y seguridad. Asegúrese de hacer y acudir a todas las citas, y llame a rowell médico si está teniendo problemas. También es miryam buena idea saber los resultados de alberto exámenes y mantener miryam lista de los medicamentos que paresh. ¿Cómo puede cuidarse en el hogar? Alivie los problemas para dormir  · Evite la cafeína en las bebidas o los chocolates a última hora del día. · Favian algo de ejercicio todos los días. · Dúchese o báñese en agua tibia antes de irse a la cama. · Coma un refrigerio liviano o flavio un vaso de leche a la hora de dormir. · Favian ejercicios de relajación en la cama para tranquilizar rowell mente y rowell cuerpo. · Apoye alberto piernas y rowell espalda con almohadas adicionales. Si duerme de costado, pruebe a ponerse miryam [employer] alberto piernas. · No use píldoras para dormir ni consuma alcohol. Podrían hacerle daño a rowell bebé.   Niraj & Malu calambres en las piernas  · No masajee rowell pantorrilla mientras tiene un calambre. · Siéntese en miryam cama o silla firme. Estire la amanda y [facility] despacio, [place] latricia, en dirección a la rodilla. Doble los dedos de los pies hacia arriba y [place]. · Póngase de pie sobre miryam superficie plana y fresca. Estire los dedos de los pies hacia arriba y dé pequeños pasos con el talón. · Use miryam almohadilla térmica o miryam bolsa de [place] para aliviar jackie musculares. Prevenga los calambres en las piernas  · Asegúrese de consumir suficiente calcio. Si está preocupada porque no está obteniendo lo suficiente, hable con rowell médico.  · Favian ejercicio todos los isaac y estire las piernas antes de irse a dormir. · Dese un baño tibio antes de irse a dormir y pruebe a usar calentadores de piernas. ¿Dónde puede encontrar más información en inglés? Nishant Stewart a [email or web address]-lukas.info/  Hugo Contreras [id number] en la búsqueda para aprender más acerca de \"Semanas 18 a 22 de rowell embarazo: Instrucciones de cuidado. \"  Revisado: 11 febrero, 2020               Versión del contenido: 12.6  © 3387-5751 Healthwise, Incorporated. Las instrucciones de cuidado fueron adaptadas bajo licencia por Good Help Connections (which disclaims liability or warranty for this information). Si usted tiene [place] afección médica o sobre estas instrucciones, siempre pregunte a rowell profesional de natividad. Healthwise, Incorporated niega toda garantía o responsabilidad por rowell uso de esta información. Precauciones en el embarazo: Instrucciones de cuidado  Pregnancy Precautions: Care Instructions  Instrucciones de cuidado    No hay miryam manera donald de prevenir el trabajo de parto antes de la fecha esperada (trabajo de parto prematuro) o de prevenir la mayoría de otros problemas en el [facility]. Eri hay cosas que puede hacer para aumentar las probabilidades de tener un embarazo saludable.  Vallorie Pat a alberto citas, siga los consejos de orwell médico y cuídese. Coma elsa y aisha ejercicio (si rowell médico lo permite). Y asegúrese de rosalia abundante agua. La atención de seguimiento es miryam parte clave de rowell tratamiento y seguridad. Asegúrese de hacer y acudir a todas las citas, y llame a rowell médico si está teniendo problemas. También es miryam buena idea saber los resultados de alberto exámenes y mantener miryam lista de los medicamentos que paresh. ¿Cómo puede cuidarse en el hogar? · Asegúrese de asistir a las citas prenatales. Rowell médico le tomará la presión arterial en cada consulta. Rowell médico también comprobará si tiene proteínas en rowell orina. Tanto la presión arterial isaac ascencion la presencia de proteínas en la orina son señales de preeclampsia. Esta afección puede ser peligrosa tanto para usted ascencion para rowell bebé. · Cadence abundantes líquidos, suficientes para que rowell orina sea de color amarillo artie o transparente ascencion el agua. La deshidratación puede causar contracciones. Si tiene [employer] [facility] [employer], el corazón o el hígado y tiene que Rajendra's líquidos, hable con rowell médico antes de aumentar rowell consumo. · Notifique a rowell médico de inmediato si presenta cualquier síntoma de infección, tales ascencion:  ? Ardor cuando orina. ? Flujo con mal olor de la vagina. ? Comezón en la vagina. ? Greer Span sin explicación. ? Dolor o sensibilidad inusual en el útero o la parte baja del abdomen. · Aliméntese en forma equilibrada. Incluya muchos alimentos que lenka ricos en calcio y mera. ? Entre los alimentos ricos en calcio se incluyen la [place], el queso, el yogur, Sirena Cart y el brócoli. ? Entre los alimentos ricos en mera se incluyen las kathleen freitas, los [place], las aves, los [place], los frijoles, las uvas pasas, el pan de grano integral y las verduras de hojas verdes. · No fume. Si necesita ayuda para dejar de fumar, hable con rowell médico sobre programas y medicamentos para dejar de fumar.  Estos pueden aumentar alberto probabilidades de dejar el hábito para siempre. · No flavio alcohol ni use drogas ilegales. · Siga las instrucciones de rowell médico acerca de la [place]. Rowell médico le dirá cuánto ejercicio puede hacer. · Pregúntele a rowell médico si puede tener [employer]. Si usted está en riesgo de tener trabajo de [place], rowell médico podría pedirle que no tenga relaciones sexuales. · Roberts precauciones para prevenir las caídas. [place] el embarazo las articulaciones están más sueltas y se tiene menos equilibrio. Los deportes tales ascencion el ciclismo, el esquí o el patinaje en línea pueden aumentar el riesgo de caídas. Y no monte a erin, anthony en motocicleta, aisha clavados, aisha esquí acuático, bucee, ni salte en paracaídas mientras está embarazada. · Evite calentarse demasiado. No use saunas ni bañeras de hidromasaje. Evite la exposición al sol en climas calientes por mucho tiempo. Roberts acetaminofén (Tylenol) para bajar miryam fiebre isaac. · No tome medicamentos de venta aristeo, productos herbarios ni suplementos sin hablar tonie con rowell médico o farmacéutico.  ¿Cuándo debe pedir ayuda? Llame al 911 en cualquier momento que considere que necesita atención de [place]. Por ejemplo, llame si:    · Se desmayó (perdió el conocimiento).     · Tiene convulsiones.     · Tiene sangrado vaginal intenso.     · Tiene dolor intenso en el abdomen o la pelvis.     · Le sale abundante líquido o gotea de la vagina y sabe o ayla que el cordón umbilical se está saliendo a rowell vagina. Si esto sucede, arrodíllese de inmediato, de dominique forma que alberto nalgas estén más altas que rowell breanna. [place] disminuirá la presión sobre el cordón umbilical hasta que llegue la [facility]. Llame a rowell médico ahora mismo o busque atención médica inmediata si:    · Tiene señales de preeclampsia, ascencion:  ? Hinchazón repentina de la rafael, las tammie o los pies. ? Nuevos problemas de visión (ascencion oscurecimiento, vish borroso o vish puntos).   ? Dolor de breanna intenso.     · Tiene cualquier sangrado vaginal.     · Tiene dolor o cólicos abdominales.     · Tiene fiebre.     · Michelle Hove tenido contracciones regulares (con o sin dolor) por Gauri Cordon. [place] significa que tiene 8 o más contracciones en 1 hora o que tiene 4 contracciones o más en 20 minutos después de [place] de posición y rosalia líquidos.     · Tiene miryam pérdida repentina de líquido por la vagina.     · Tiene dolor en la parte baja de la espalda o presión en la pelvis que no desaparece.     · Nota que rowell bebé ha dejado de moverse o se mueve mucho menos de lo normal.   Preste especial atención a los cambios en rowell natividad y asegúrese de comunicarse con rowell médico si tiene algún problema. ¿Dónde puede encontrar más información en inglés? Parish Juan a [email or web address]  Magdaleno Morris [id number] en la búsqueda para aprender más acerca de \"Precauciones en el embarazo: Instrucciones de cuidado. \"  Revisado: 11 febrero, 2020               Versión del contenido: 12.6  © 6200-1673 Healthwise, Incorporated. Las instrucciones de cuidado fueron adaptadas bajo licencia por Good Help Connections (which disclaims liability or warranty for this information). Si usted tiene [place] afección médica o sobre estas instrucciones, siempre pregunte a rowell profesional de natividad. Healthwise, Incorporated niega toda garantía o responsabilidad por rowell uso de esta información. Jose Ab: Instrucciones de cuidado  Nutrition During Pregnancy: Care Instructions  Instrucciones de cuidado    Beatrice en forma saludable florence el embarazo es importante para usted y rowell bebé. Puede ayudarla a sentirse elsa y tener un embarazo y parto exitosos. [place] el embarazo alberto necesidades nutricionales aumentan. Aun si tiene excelentes hábitos alimentarios, rowell médico le podría recomendar un multivitamínico para asegurarse de que reciba suficiente mera y ácido fólico.  Muchas mujeres embarazadas se preguntan cuánto deben aumentar de peso.  En general, a las mujeres que antes del [facility] tenían un peso saludable, se les recomienda aumentar entre 25 y 28 libras (entre 11 y 16 kg). A las mujeres que tenían sobrepeso antes del [facility], se les suele recomendar que aumenten de 15 a 25 libras (de 7 a 11 kg). A las mujeres cuyo peso estaba debajo del peso normal antes del [facility], se les suele recomendar que aumenten de 28 a 40 libras (de 13 a 18 kg). Colaborará con rowell médico para establecer un peso objetivo. Aumentar miryam cantidad saludable de peso la ayudará a tener un bebé thi. La atención de seguimiento es miryam parte clave de rowell tratamiento y seguridad. Asegúrese de hacer y acudir a todas las citas, y llame a rowell médico si está teniendo problemas. También es miryam buena idea saber los resultados de alberto exámenes y mantener miryam lista de los medicamentos que paresh. ¿Cómo puede cuidarse en el hogar? · Coma abundantes frutas y verduras. Incluya miryam variedad de verduras de hoja lan oscuro, color naranja y [employer]. · Escoja panes, cereales y pastas de grano entero. Los panes y las pastas de arlene entero, el arroz integral y la rylee son Lulu Marcy opciones. · Consuma 4 porciones o más de [place] y [address]. [employer] o semidescremada, y el yogur y el queso sin grasa o con poca grasa son Lulu Marcy opciones. Si no puede consumir lácteos, obtenga el calcio que rowell cuerpo necesita de productos enriquecidos con calcio, ascencion jugo de The [employer] ricardo plc, [place] de soya y tofu. Entre otras ramos no lácteas de calcio se [employer] verduras de hojas verdes, ascencion el brócoli, la col rizada, las hojas de [place], las hojas de nabo, el bok Karl y las coles de [place]. · Si come carne, escoja aquellos tipos que tengan menos grasa. Derald Cuff opciones son los kemp [place] de carne y el ismael o el pavo sin piel. · No coma tiburón, pez emiliano, caballa gigante ni blanquillo.  Estos alimentos tienen [employer] de pamela, que es peligroso para el bebé. Puede comer hasta 12 onzas (340 gramos) de pescado o mariscos que tengan bajo nivel de pamela a la semana. Los camarones, el salmón nga, el abadejo y el bagre son Areta Crutch opciones. No coma más de 6 onzas (170 gramos) de atún a la semana. · [place] embutidos, ascencion el de [place], el jamón y la [place], a 165°F (74°C) antes de comerlos. [place] disminuye el riesgo de enfermarse a causa de un tipo de bacteria que puede encontrarse en los embutidos. · No coma quesos blandos sin pasteurizar, ascencion queso \"brie\", feta, mozzarella fresca y [place]. Estas clases de quesos tienen miryam bacteria que puede perjudicar al bebé. · Limite la cafeína. Si parul café o té, no tome más de 1 taza al día. Las bebidas cola también contienen cafeína. · No flavio nada de alcohol. No se ha determinado que ninguna cantidad de alcohol sea donald minerva el embarazo. · No se ponga a dieta ni trate de bajar de peso. Por ejemplo, no siga miryam dieta baja en carbohidratos. Si tiene sobrepeso al comienzo del [facility], rowell médico colaborará con usted para manejar el aumento de [place]. · Infórmele a rowell médico sobre todas las vitaminas y los suplementos que tome. ¿Cuándo debe pedir ayuda? Vigile muy de cerca los cambios en rowell natividad, y asegúrese de comunicarse con rowell médico si tiene algún problema. ¿Dónde puede encontrar más información en inglés? Vaya a [email or web address].gray.com/  Javed Manning [id number] en la búsqueda para aprender más acerca de \"Nutrición minerva el embarazo: Instrucciones de cuidado. \"  Revisado: 11 febrero, 2020               Versión del contenido: 12.6  © 1976-2605 [employer]. Las instrucciones de cuidado fueron adaptadas bajo licencia por Good Help Connections (which disclaims liability or warranty for this information). Si usted tiene [place] afección médica o sobre estas instrucciones, siempre pregunte a rowell profesional de natividad.  [employer] niega toda garantía o responsabilidad por choe uso de esta información. Aprenda sobre el [employer]  Learning About Pregnancy  Instrucciones de cuidado    Choe natividad minerva las primeras semanas del [employer] es de particular importancia para la natividad de choe bebé. Cuídese. Cualquier cosa que perjudique choe organismo puede perjudicar a choe bebé. Asegúrese de asistir a todas alberto citas médicas. Los chequeos médicos regulares les ayudarán a usted y a choe bebé a mantenerse saludables. La atención de seguimiento es miryam parte clave de choe tratamiento y seguridad. Asegúrese de hacer y acudir a todas las citas, y llame a choe médico si está teniendo problemas. También es miryam buena idea saber los resultados de alberto exámenes y mantener miryam lista de los medicamentos que paresh. ¿Cómo puede cuidarse en el hogar? Alimentación    · Siga miryam Urrutia Stair. Asegúrese de incluir en choe dieta abundantes frijoles (habichuelas), arvejas (chícharos) y verduras de hojas verdes.     · No se salte las comidas ni pase muchas horas sin comer. Si tiene náuseas, trate de comer un refrigerio pequeño y saludable cada 2 o 3 horas.     · No consuma pescados que tengan [address] de pamela, ascencion tiburón, pez emiliano o caballa. No coma más de miryam sherry de atún a la semana.     · Flavio abundantes líquidos, suficientes para que choe orina sea de color amarillo artie o transparente ascencion el agua. Si tiene [employer], el corazón o el hígado y tiene que [place]'s líquidos, hable con choe médico antes de aumentar choe consumo.     · Reduzca la cafeína, ascencion el café, el té y las bebidas de cola.     · No flavio alcohol, ascencion cerveza, vino o licor dann.     · Rachel un multivitamínico que contenga al menos 400 microgramos (mcg) de ácido fólico para ayudar a prevenir las anomalías congénitas (de nacimiento). El cereal enriquecido y el santoyo integral son buenas ramos adicionales de ácido fólico.     · Aumente el calcio en choe Terryann Panchito.  Trate de beber un cuarto de galón de leche descremada todos los días. También podría rosalia suplementos de calcio y elegir alimentos ascencion el queso y el yogur.   Estilo de jimmy    · Asegúrese de asistir a las citas de seguimiento.     · Descanse lo suficiente. Mientras esté embarazada podría sentirse más cansada de lo normal.     · Favian por lo menos 30 minutos de ejercicio la mayoría de los días de la semana. Caminar es miryam buena opción. Si no ha hecho ejercicio en el pasado, comience poco a poco. Favian varias caminatas cortas todos los días.     · No fume. Si necesita ayuda para dejar de fumar, hable con rowell médico sobre programas para dejar de fumar. Estos pueden aumentar alberto probabilidades de dejar el hábito para siempre.     · No toque heces de constantine ni rowell caja de excrementos. Además, lávese las tammie luego de manipular carne cruda y cocine elsa toda la carne antes de comerla. Use guantes cuando trabaje en el jardín y lávese elsa las tammie cuando termine. Las heces de constantine, la carne cruda o poco cocida, y la yovani contaminada pueden causar infecciones que podrían perjudicar a rowell bebé o conducir a un aborto espontáneo.     · No use bañeras de hidromasaje ni saunas. Elevar la temperatura corporal podría perjudicar a rowell bebé.     · Evite los gases de las sustancias químicas y la pintura, o los venenos.     · No use drogas ilegales ni flavio alcohol.   Medicamentos    · Revise todos los medicamentos que esté tomando con rowell médico. Quizá sea necesario cambiar algunos de alberto medicamentos de rutina para proteger al bebé.     · [place] acetaminofén (Tylenol) para aliviar problemas menores, ascencion dolor de breanna o de espalda leves o fiebre leve con síntomas de resfriado. No utilice medicamentos antiinflamatorios no esteroideos (GARY), tales ascencion ibuprofeno (Advil, Motrin) o naproxeno (Aleve), a menos que rowell médico lo autorice.     · No tome dos o más analgésicos (medicamentos para el dolor) al mismo tiempo a menos que el médico se lo haya indicado. Muchos analgésicos  contienen acetaminofén, es decir, Tylenol. El exceso de acetaminofén (Tylenol) puede ser dañino.     · [place] los medicamentos exactamente ascencion le fueron recetados. Llame a rowell médico si ayla estar teniendo problemas con rowell medicamento. [address] náuseas del embarazo    · Si siente náuseas al levantarse, pruebe a rosalia un refrigerio pequeño (ascencion galletas saladas) antes de salir de la cama. Dese algún tiempo para digerir el refrigerio y salga de la cama lentamente.     · No se salte las comidas ni pase mucho tiempo sin comer. Un estómago vacío puede empeorar las náuseas.     · Consuma comidas pequeñas y frecuentes en lugar de caprice comidas abundantes al día.     · [place] abundantes líquidos. Las bebidas deportivas, ascencion Gatorade o Powerade, son buenas opciones.     · Consuma alimentos ricos en proteínas dimitris bajos en grasas.     · Si está tomando suplementos de mera, pregúntele a rowell médico si son necesarios. El mera puede empeorar las náuseas.     · Evite cualquier [employer] le produzca náuseas, ascencion el del café.     · Descanse mucho. Las náuseas del embarazo podrían empeorar si está cansada. ¿Dónde puede encontrar más información en inglés? Tad Given a [email or web address].gray.com/  Abdullahi [id number] en la búsqueda para aprender más acerca de \"Aprenda sobre el [facility]. \"  Revisado: 11 febrero, 2020               Versión del contenido: 12.6  © 6852-4171 Healthwise, Incorporated. Las instrucciones de cuidado fueron adaptadas bajo licencia por Good Help Connections (which disclaims liability or warranty for this information). Si usted tiene [place] afección médica o sobre estas instrucciones, siempre pregunte a rowell profesional de natividad. [facility], Incorporated niega toda garantía o responsabilidad por rowell uso de esta información.

## 2021-03-01 NOTE — PROGRESS NOTES
Identified pt with two pt identifiers(name and ). Reviewed record in preparation for visit and have obtained necessary documentation. Chief Complaint   Patient presents with    Routine Prenatal Visit     She is 19w 1d today. She denies bleeding, leakage of fluids, and contractions. She cannot feel the baby moving yet. She is taking her prenatal vitamins. Health Maintenance Due   Topic    Hepatitis C Screening     COVID-19 Vaccine (1 of 2)       Visit Vitals  BP (!) 93/57 (BP 1 Location: Right upper arm, BP Patient Position: Sitting, BP Cuff Size: Large adult)   Pulse 65   Temp 97.4 °F (36.3 °C) (Temporal)   Resp 18   Ht 4' 8.5\" (1.435 m)   Wt 140 lb 6.4 oz (63.7 kg)   BMI 30.92 kg/m²         Coordination of Care Questionnaire:  :   1) Have you been to an emergency room, urgent care, or hospitalized since your last visit? If yes, where when, and reason for visit? no       2. Have seen or consulted any other health care provider since your last visit? If yes, where when, and reason for visit?   NO

## 2021-03-09 ENCOUNTER — CLINICAL SUPPORT (OUTPATIENT)
Dept: FAMILY MEDICINE CLINIC | Age: 41
End: 2021-03-09

## 2021-03-09 VITALS
RESPIRATION RATE: 17 BRPM | TEMPERATURE: 97.5 F | DIASTOLIC BLOOD PRESSURE: 64 MMHG | OXYGEN SATURATION: 100 % | SYSTOLIC BLOOD PRESSURE: 100 MMHG | HEART RATE: 60 BPM

## 2021-03-09 DIAGNOSIS — Z87.51 HISTORY OF PRETERM DELIVERY: ICD-10-CM

## 2021-03-09 PROCEDURE — 96372 THER/PROPH/DIAG INJ SC/IM: CPT | Performed by: FAMILY MEDICINE

## 2021-03-09 RX ORDER — HYDROXYPROGESTERONE CAPROATE 250 MG/ML
250 INJECTION INTRAMUSCULAR
Status: SHIPPED | OUTPATIENT
Start: 2021-03-09

## 2021-03-09 RX ADMIN — HYDROXYPROGESTERONE CAPROATE 250 MG: 250 INJECTION INTRAMUSCULAR at 11:59

## 2021-03-16 ENCOUNTER — CLINICAL SUPPORT (OUTPATIENT)
Dept: FAMILY MEDICINE CLINIC | Age: 41
End: 2021-03-16

## 2021-03-16 VITALS
DIASTOLIC BLOOD PRESSURE: 69 MMHG | SYSTOLIC BLOOD PRESSURE: 103 MMHG | RESPIRATION RATE: 16 BRPM | WEIGHT: 142.2 LBS | BODY MASS INDEX: 30.68 KG/M2 | OXYGEN SATURATION: 98 % | HEART RATE: 59 BPM | TEMPERATURE: 96.4 F | HEIGHT: 57 IN

## 2021-03-16 DIAGNOSIS — Z87.51 HISTORY OF PRETERM DELIVERY: Primary | ICD-10-CM

## 2021-03-16 RX ORDER — HYDROXYPROGESTERONE CAPROATE 250 MG/ML
250 INJECTION INTRAMUSCULAR ONCE
Qty: 1 VIAL | Refills: 0 | Status: SHIPPED | COMMUNITY
Start: 2021-03-16 | End: 2021-03-16

## 2021-03-16 NOTE — PROGRESS NOTES
Chief Complaint   Patient presents with    Immunization/Injection     Encounter for pepito injection     Visit Vitals  /69 (BP 1 Location: Right upper arm, BP Patient Position: Sitting, BP Cuff Size: Large adult)   Pulse (!) 59   Temp (!) 96.4 °F (35.8 °C) (Temporal)   Resp 16   Ht 4' 8.5\" (1.435 m)   Wt 142 lb 3.2 oz (64.5 kg)   SpO2 98%   BMI 31.32 kg/m²

## 2021-04-03 ENCOUNTER — TELEPHONE (OUTPATIENT)
Dept: FAMILY MEDICINE CLINIC | Age: 41
End: 2021-04-03

## 2021-04-04 NOTE — TELEPHONE ENCOUNTER
----- Message from Fernando Michael sent at 3/31/2021 10:37 AM EDT -----  Regarding: Dr Simone Caro  Appointment not available - NEEDS     Caller's first and last name and relationship to patient (if not the patient): Pt      Best contact number: [phone]      Preferred date and time: any time is fine      Scheduled appointment date and time: N/A - send msg per guidelines      Reason for appointment: pre-, pt needs an injection each week to protect her baby (per caller)      Details to clarify the request: N/A      Fernando Michael

## 2021-04-07 ENCOUNTER — ROUTINE PRENATAL (OUTPATIENT)
Dept: FAMILY MEDICINE CLINIC | Age: 41
End: 2021-04-07
Payer: MEDICAID

## 2021-04-07 VITALS
HEART RATE: 68 BPM | SYSTOLIC BLOOD PRESSURE: 94 MMHG | RESPIRATION RATE: 20 BRPM | BODY MASS INDEX: 30.51 KG/M2 | HEIGHT: 57 IN | DIASTOLIC BLOOD PRESSURE: 58 MMHG | TEMPERATURE: 98.2 F | OXYGEN SATURATION: 97 % | WEIGHT: 141.4 LBS

## 2021-04-07 DIAGNOSIS — Z3A.24 24 WEEKS GESTATION OF PREGNANCY: Primary | ICD-10-CM

## 2021-04-07 LAB — GLUCOSE 1H P 100 G GLC PO SERPL-MCNC: 106 MG/DL (ref 65–140)

## 2021-04-07 PROCEDURE — 0502F SUBSEQUENT PRENATAL CARE: CPT | Performed by: STUDENT IN AN ORGANIZED HEALTH CARE EDUCATION/TRAINING PROGRAM

## 2021-04-07 NOTE — TELEPHONE ENCOUNTER
Patient has missed too many doses to continue pepito injections. Patient has appointment with you today.

## 2021-04-07 NOTE — PROGRESS NOTES
I reviewed with the resident the medical history and the resident's findings on the physical examination. I discussed with the resident the patient's diagnosis and concur with the plan. 41yo [id number] @ 24w3d by LMP  1. IUP: RH pos, NIPT declined, GTT today, will need 3rd tri CBC   2. AMA  3.  Grand multip: consider prophylactic agent in labor   4. PTD: per [facility] records PTD x2 (36.0 and 36.7wk), was on Ava but has missed too many doses so will not continue it. MFM for CLS - normal.  5.  Hx severe preE: ASA  6.   Hx cholestasis

## 2021-04-07 NOTE — PROGRESS NOTES
Chief Complaint   Patient presents with    Routine Prenatal Visit     ; 24w 3d; Patient presents for routine prenatal care; no vaginal bleeding; no loss of fluid; no contractions or pain; Positive fetal movement. Visit Vitals  BP (!) 94/58 (BP 1 Location: Left upper arm, BP Patient Position: Sitting, BP Cuff Size: Adult)   Pulse 68   Temp 98.2 °F (36.8 °C) (Temporal)   Resp 20   Ht 4' 8.5\" (1.435 m)   Wt 141 lb 6.4 oz (64.1 kg)   SpO2 97%   BMI 31.14 kg/m²        1. Have you been to the ER, urgent care clinic since your last visit? Hospitalized since your last visit? No     2. Have you seen or consulted any other health care providers outside of the [address] [place] [address] since your last visit? Include any pap smears or colon screening.  No

## 2021-04-07 NOTE — PROGRESS NOTES
Return OB Visit       Subjective:   Amanda Flanaganster 47 y.o. Sadiq Quinteros   ALBERT: 2021, Date entered prior to episode creation  GA:  24w3d. No concerns. LOF: No  Vaginal bleeding: No  Fetal movement (after 20 weeks): Yes  Contractions: No    Pt denies fever, chills, HA, vision disturbances, RUQ pain, chest pain, SOB, N/V/D, constipation, urinary problems, foul smelling vaginal discharge, LE Edema worse than usual.     OB History    Para Term  AB Living   7 6 4 2 0 6   SAB TAB Ectopic Molar Multiple Live Births                    # Outcome Date GA Lbr Joe/2nd Weight Sex Delivery Anes PTL Lv   7 Current            6 Term 17 39w0d  6 lb (2.722 kg) M Vag-Spont      5  03/31/15 36w5d  5 lb 15.2 oz (2.7 kg) F Vag-Spont      4  10/26/07 36w0d  6 lb 6.4 oz (2.903 kg) M Vag-Spont      3 Term 05 40w0d  7 lb 1.6 oz (3.221 kg) F Vag-Spont      2 Term 03 40w0d  6 lb 3.2 oz (2.812 kg) F Vag-Spont      1 Term 01 40w0d  7 lb 14.4 oz (3.583 kg) M Vag-Spont         Obstetric Comments   Pt states she had one  delivery in 10/2007 @ 35 weeks. Allergies- reviewed:   No Known Allergies  Medications- reviewed:   Current Outpatient Medications   Medication Sig    prenatal multivit-ca-min-fe-fa tab Take  by mouth. Current Facility-Administered Medications   Medication Dose Route Frequency    HYDROXYprogesterone (PF) (MAIA) injection 250 mg  250 mg IntraMUSCular Q7D     Past Medical History- reviewed:  History reviewed. No pertinent past medical history. Past Surgical History- reviewed:   History reviewed. No pertinent surgical history.   Social History- reviewed:  Social History     Socioeconomic History    Marital status: SINGLE     Spouse name: Not on file    Number of children: Not on file    Years of education: Not on file    Highest education level: Not on file   Occupational History    Not on file   Social Needs    Financial resource strain: Not on file    Food insecurity     Worry: Not on file     Inability: Not on file    Transportation needs     Medical: Not on file     Non-medical: Not on file   Tobacco Use    Smoking status: Never Smoker    Smokeless tobacco: Never Used   Substance and Sexual Activity    Alcohol use: No    Drug use: Not on file    Sexual activity: Not on file   Lifestyle    Physical activity     Days per week: Not on file     Minutes per session: Not on file    Stress: Not on file   Relationships    Social connections     Talks on phone: Not on file     Gets together: Not on file     Attends Religion service: Not on file     Active member of club or organization: Not on file     Attends meetings of clubs or organizations: Not on file     Relationship status: Not on file    Intimate partner violence     Fear of current or ex partner: Not on file     Emotionally abused: Not on file     Physically abused: Not on file     Forced sexual activity: Not on file   Other Topics Concern    Not on file   Social History Narrative    Not on file     Immunizations- reviewed:   Immunization History   Administered Date(s) Administered    Influenza Vaccine [employer]) PF (>6 Mo Flulaval, Fluarix, and >3 Yrs Afluria, Fluzone 80704) 02/01/2021    Influenza Vaccine Intradermal PF 11/02/2017, 11/30/2018    Tdap 02/22/2015       Objective:     Visit Vitals  BP (!) 94/58 (BP 1 Location: Left upper arm, BP Patient Position: Sitting, BP Cuff Size: Adult)   Pulse 68   Temp 98.2 °F (36.8 °C) (Temporal)   Resp 20   Ht 4' 8.5\" (1.435 m)   Wt 141 lb 6.4 oz (64.1 kg)   LMP 10/18/2020 (Exact Date)   SpO2 97%   BMI 31.14 kg/m²       Physical Exam:  GENERAL APPEARANCE: alert, well appearing, in no apparent distress  ABDOMEN: gravid, Fundal height 25 FHT present at 130  bpm  PSYCH: normal mood and affect     Labs  No results found for this or any previous visit (from the past 12 hour(s)).       Assessment         ICD-10-CM ICD-9-CM    1. 24 weeks gestation of pregnancy  Z3A.24 V22.2 GLUCOSE, GESTATIONAL 1 HR TOLERANCE         Plan   47 y.o. Eleni Goss  24w3d ALBERT 2021, Date entered prior to episode creation here for return OB visit     1. SIUP at 24w2d  -PNL: O pos, antibody neg, Hep neg, hgb 13.1, rubella/varicella immune, RPR NR, HIV NR, hgb fractionation wnl, chlamydia, gonorrhea and trich negative, urine culture with no growth. PAP NILM hpv neg on 21.  -Genetic testing: Declined  -Immunizations: s/p flu  -Ultrasound: Anatomy scan nml at 17w4d. EFW appropriate. Cervical Length Normal (h/o two  deliveries, on [place])  - GTT today  - Follow up in 4 weeks      PREGNANCY CONCERNS  H/o Severe pre-eclampsia per [facility] records  - ASA 81mg daily  - Continue home monitoring of BP     H/o  birth per [facility] records: Received Ava injections in previous pregnancies. - Patient missed too many injections, no longer able to continue [place]    H/o cholestasis in pregnancy per [facility] records  - monitor for symptoms        Orders Placed This Encounter    GLUCOSE, GESTATIONAL 1 HR TOLERANCE     Standing Status:   Future     Standing Expiration Date:   2022     Labor precautions discussed, including: Regular painful contractions, lasting for greater than one hour, taking your breath away; any vaginal bleeding; any leakage of fluid; or absent or decreased fetal movement. Call M.D. on call if any of these symptoms or signs occur. I have discussed the diagnosis with the patient and the intended plan as seen in the above orders. The patient has received an after-visit summary and questions were answered concerning future plans. I have discussed medication side effects and warnings with the patient as well. Informed pt to return to the office or go to the ER if she experiences vaginal bleeding, vaginal discharge, leaking of fluid, pelvic cramping.     Pt seen and discussed with Dr. Liss Mtz (attending physician)    Chelsey Contreras DO  Family Medicine Resident

## 2021-04-16 ENCOUNTER — HOSPITAL ENCOUNTER (OUTPATIENT)
Dept: PERINATAL CARE | Age: 41
Discharge: HOME | End: 2021-04-16
Attending: OBSTETRICS & GYNECOLOGY
Payer: MEDICAID

## 2021-04-16 PROCEDURE — 76816 OB US FOLLOW-UP PER FETUS: CPT | Performed by: OBSTETRICS & GYNECOLOGY

## 2021-05-02 NOTE — PROGRESS NOTES
Return OB Visit       Subjective:   Betito Wolf 47 y.o. Olga Cornejo   ALBERT: 7/25/2021, Date entered prior to episode creation  GA:  28w0d. Patient complains of intermittent R sided abdominal pain (RUQ to RLQ), worse after eating. Endorses 3-4 episodes of nausea per week, without vomiting. Denies pruritis. Does have a history of cholestasis in previous pregnancy, but was not on medication. States she does not have abdominal pain, chest pain, contractions, fever, headache,  pelvic pressure, shortness of breath, swelling, vaginal bleeding, vaginal leaking of fluid  and visual disturbances, + fetal movement. She is taking PNV. Allergies- reviewed:   No Known Allergies  Medications- reviewed:   Current Outpatient Medications   Medication Sig    prenatal multivit-ca-min-fe-fa tab Take  by mouth. Current Facility-Administered Medications   Medication Dose Route Frequency    HYDROXYprogesterone (PF) (MAIA) injection 250 mg  250 mg IntraMUSCular Q7D     Past Medical History- reviewed:  No past medical history on file. Past Surgical History- reviewed:   No past surgical history on file.   Social History- reviewed:  Social History     Socioeconomic History    Marital status: SINGLE     Spouse name: Not on file    Number of children: Not on file    Years of education: Not on file    Highest education level: Not on file   Occupational History    Not on file   Social Needs    Financial resource strain: Not on file    Food insecurity     Worry: Not on file     Inability: Not on file    Transportation needs     Medical: Not on file     Non-medical: Not on file   Tobacco Use    Smoking status: Never Smoker    Smokeless tobacco: Never Used   Substance and Sexual Activity    Alcohol use: No    Drug use: Not on file    Sexual activity: Not on file   Lifestyle    Physical activity     Days per week: Not on file     Minutes per session: Not on file    Stress: Not on file   Relationships    Social connections     Talks on phone: Not on file     Gets together: Not on file     Attends Church service: Not on file     Active member of club or organization: Not on file     Attends meetings of clubs or organizations: Not on file     Relationship status: Not on file    Intimate partner violence     Fear of current or ex partner: Not on file     Emotionally abused: Not on file     Physically abused: Not on file     Forced sexual activity: Not on file   Other Topics Concern    Not on file   Social History Narrative    Not on file     Immunizations- reviewed:   Immunization History   Administered Date(s) Administered    Influenza Vaccine [employer]) PF (>6 Mo Flulaval, Fluarix, and >3 Yrs [facility], Fluzone [address]203) 2021    Influenza Vaccine Intradermal PF 2017, 2018    Tdap 2015       Objective:     Visit Vitals  LMP 10/18/2020 (Exact Date)       Physical Exam:  GENERAL APPEARANCE: alert, well appearing, in no apparent distress  ABDOMEN: gravid, fundal height 27 cm, FHT present at 135 bpm  PSYCH: normal mood and affect    Labs  No results found for this or any previous visit (from the past 12 hour(s)). Plan   47 y.o. [facility] [address] 28w1d ALBERT 2021, here for return OB visit. 1.SIUP at 24w2d  -PNL: O pos, antibody neg, Hep neg, hgb 13.1, rubella/varicella immune, RPR NR, HIV NR, hgb fractionation wnl, chlamydia, gonorrhea and trich negative, urine culture with no growth. PAP NILM, HPV neg on 21.  -Genetic testing: Declined  -Immunizations: s/p flu. Tdap today  -Ultrasound with MFM (25w5d): nml female anatomy, EFW 59th%. Cervical length normal on 17wk scan (h/o two  deliveries, on [place])  -GTT 1hr passed  -CBC at next visit     PREGNANCY CONCERNS  H/o Severe pre-eclampsia per [facility] records  - ASA 81mg daily  - Continue home monitoring of BP     H/o  birth per [facility] records: PTD x2 (36.0 and 36.7wk), Received Ava injections in previous pregnancies.    - Patient missed too many injections, no longer able to continue [place]     H/o cholestasis in pregnancy per [facility] records. Complaining of RUQ pain without itchiness. - continue to monitor   - bile acids + LFTs if develops pruritis    Grand multip/AMA  - ASA  - consider prophylaxis with (TXA/methergine/pit) in labor    Follow up visit: May 17 with James Cox     · Other  · Continuity Provider: Vera Mccloud  · Pain mgmt. in labor:   · Feeding:   · Circ:   · Social:       No orders of the defined types were placed in this encounter. Labor precautions discussed, including: Regular painful contractions, lasting for greater than one hour, taking your breath away; any vaginal bleeding; any leakage of fluid; or absent or decreased fetal movement. Call M.D. on call if any of these symptoms or signs occur. I have discussed the diagnosis with the patient and the intended plan as seen in the above orders. The patient has received an after-visit summary and questions were answered concerning future plans. I have discussed medication side effects and warnings with the patient as well. Informed pt to return to the office or go to the ER if she experiences vaginal bleeding, vaginal discharge, leaking of fluid, pelvic cramping.     Pt discussed with Dr. Diane Beck (attending physician)    James Cox MD  Family Medicine Resident

## 2021-05-03 ENCOUNTER — TELEPHONE (OUTPATIENT)
Dept: FAMILY MEDICINE CLINIC | Age: 41
End: 2021-05-03

## 2021-05-03 ENCOUNTER — ROUTINE PRENATAL (OUTPATIENT)
Dept: FAMILY MEDICINE CLINIC | Age: 41
End: 2021-05-03
Payer: MEDICAID

## 2021-05-03 VITALS
WEIGHT: 145.8 LBS | OXYGEN SATURATION: 98 % | DIASTOLIC BLOOD PRESSURE: 55 MMHG | TEMPERATURE: 97.8 F | HEIGHT: 57 IN | BODY MASS INDEX: 31.45 KG/M2 | SYSTOLIC BLOOD PRESSURE: 94 MMHG | RESPIRATION RATE: 16 BRPM | HEART RATE: 75 BPM

## 2021-05-03 DIAGNOSIS — Z34.90 PRENATAL CARE, ANTEPARTUM: Primary | ICD-10-CM

## 2021-05-03 PROCEDURE — 0502F SUBSEQUENT PRENATAL CARE: CPT | Performed by: STUDENT IN AN ORGANIZED HEALTH CARE EDUCATION/TRAINING PROGRAM

## 2021-05-03 PROCEDURE — 90471 IMMUNIZATION ADMIN: CPT | Performed by: STUDENT IN AN ORGANIZED HEALTH CARE EDUCATION/TRAINING PROGRAM

## 2021-05-03 PROCEDURE — 90715 TDAP VACCINE 7 YRS/> IM: CPT | Performed by: STUDENT IN AN ORGANIZED HEALTH CARE EDUCATION/TRAINING PROGRAM

## 2021-05-03 NOTE — PROGRESS NOTES
I reviewed with the resident the medical history and the resident's findings on the physical examination. I discussed with the resident the patient's diagnosis and concur with the plan. 41yo [id number] @ 28w1d by LMP  1. IUP: RH pos, NIPT declined, GTT wnl, will need 3rd tri CBC, s/p tdap   2. AMA  3.  Grand multip: consider prophylactic agent in labor   4. PTD: per [facility] records PTD x2 (36.0 and 36.7wk), was on Ava but has missed too many doses so will not continue it. MFM for CLS - normal.  5.  Hx severe preE: ASA   6.   Hx cholestasis

## 2021-05-03 NOTE — TELEPHONE ENCOUNTER
----- Message from Jose Mckeon sent at 4/29/2021  3:48 PM EDT -----  Regarding: Dr. Theodore Durant: [phone]  General Message/Vendor Calls    Caller's first and last name: Agnieszka Connor, [address] [facility]      Reason for call: Treatment on patient. Callback required yes/no and why: Yes/Confirm      Best contact number(s): [phone]  ext.  [phone]      Details to clarify the request:N/A      Jose Mckeon

## 2021-05-03 NOTE — PROGRESS NOTES
Chief Complaint   Patient presents with    Routine Prenatal Visit     she is 28w 1d today. she denies bleeding, LOF, contractions, and pain. she can feel baby moving. 1. Have you been to the ER, urgent care clinic since your last visit? Hospitalized since your last visit? No    2. Have you seen or consulted any other health care providers outside of the [address] [place] [address] since your last visit? Include any pap smears or colon screening.  No

## 2021-05-05 NOTE — TELEPHONE ENCOUNTER
Arely/Telephone  Received: Trinna Osgood  Message Contents   Cyndi Farley [facility] Front Office             Patient return call     Caller's first and last name and relationship (if not the patient):Emani[facility]       Best contact number(s): [phone]       Whose call is being returned: Alma Gamez       Details to clarify the request: return call, refused to give any message       Elenita Marrufo

## 2021-05-17 ENCOUNTER — ROUTINE PRENATAL (OUTPATIENT)
Dept: FAMILY MEDICINE CLINIC | Age: 41
End: 2021-05-17
Payer: MEDICAID

## 2021-05-17 VITALS
BODY MASS INDEX: 31.8 KG/M2 | WEIGHT: 147.4 LBS | RESPIRATION RATE: 16 BRPM | TEMPERATURE: 98.7 F | OXYGEN SATURATION: 96 % | SYSTOLIC BLOOD PRESSURE: 96 MMHG | HEART RATE: 61 BPM | DIASTOLIC BLOOD PRESSURE: 60 MMHG | HEIGHT: 57 IN

## 2021-05-17 DIAGNOSIS — Z3A.30 30 WEEKS GESTATION OF PREGNANCY: Primary | ICD-10-CM

## 2021-05-17 PROCEDURE — 0502F SUBSEQUENT PRENATAL CARE: CPT | Performed by: STUDENT IN AN ORGANIZED HEALTH CARE EDUCATION/TRAINING PROGRAM

## 2021-05-17 RX ORDER — GUAIFENESIN 100 MG/5ML
81 LIQUID (ML) ORAL DAILY
COMMUNITY

## 2021-05-17 NOTE — PROGRESS NOTES
I reviewed with the resident the medical history and the resident's findings on the physical examination. I discussed with the resident the patient's diagnosis and concur with the plan. 41yo [id number] @ 30w1d by LMP  1. IUP: RH pos, NIPT declined, GTT wnl, will need 3rd tri CBC, s/p tdap   2. AMA  3.  Grand multip: consider prophylactic agent in labor   4. PTD: per [facility] records PTD x2 (36.0 and 36.7wk), was on Ava but has missed too many doses so will not continue it. MFM for CLS - normal.  5.  Hx severe preE: ASA    6.   Hx cholestasis

## 2021-05-17 NOTE — PROGRESS NOTES
Return OB Visit       Subjective:   Omer Stager 47 y.o. [address] [facility]   ALBERT: 7/25/2021, Date entered prior to episode creation  GA:  30w1d. LOF: no  Vaginal bleeding: no  Fetal movement (after 20 weeks): yes  Contractions: no  Prenatal vitamins: yes    Complaining of cramping in her feet bilaterally. Denies LE swelling. Allergies- reviewed:   No Known Allergies  Medications- reviewed:   Current Outpatient Medications   Medication Sig    aspirin 81 mg chewable tablet Take 81 mg by mouth daily.  prenatal multivit-ca-min-fe-fa tab Take  by mouth. Current Facility-Administered Medications   Medication Dose Route Frequency    HYDROXYprogesterone (PF) (MAIA) injection 250 mg  250 mg IntraMUSCular Q7D     Past Medical History- reviewed:  History reviewed. No pertinent past medical history. Past Surgical History- reviewed:   History reviewed. No pertinent surgical history.   Social History- reviewed:  Social History     Socioeconomic History    Marital status: SINGLE     Spouse name: Not on file    Number of children: Not on file    Years of education: Not on file    Highest education level: Not on file   Occupational History    Not on file   Social Needs    Financial resource strain: Not on file    Food insecurity     Worry: Not on file     Inability: Not on file    Transportation needs     Medical: Not on file     Non-medical: Not on file   Tobacco Use    Smoking status: Never Smoker    Smokeless tobacco: Never Used   Substance and Sexual Activity    Alcohol use: No    Drug use: Not on file    Sexual activity: Not on file   Lifestyle    Physical activity     Days per week: Not on file     Minutes per session: Not on file    Stress: Not on file   Relationships    Social connections     Talks on phone: Not on file     Gets together: Not on file     Attends Bahai service: Not on file     Active member of club or organization: Not on file     Attends meetings of clubs or organizations: Not on file     Relationship status: Not on file    Intimate partner violence     Fear of current or ex partner: Not on file     Emotionally abused: Not on file     Physically abused: Not on file     Forced sexual activity: Not on file   Other Topics Concern    Not on file   Social History Narrative    Not on file     Immunizations- reviewed:   Immunization History   Administered Date(s) Administered    Influenza Vaccine [employer]) PF (>6 Mo Flulaval, Fluarix, and >3 Yrs Afluria, Fluzone 44092) 2021    Influenza Vaccine Intradermal PF 2017, 2018    Tdap 2015, 2021       Objective:     Visit Vitals  BP 96/60 (BP 1 Location: Right upper arm, BP Patient Position: Sitting)   Pulse 61   Temp 98.7 °F (37.1 °C) (Oral)   Resp 16   Ht 4' 8.5\" (1.435 m)   Wt 147 lb 6.4 oz (66.9 kg)   LMP 10/18/2020 (Exact Date)   SpO2 96%   BMI 32.46 kg/m²       Physical Exam:  GENERAL APPEARANCE: alert, well appearing, in no apparent distress  ABDOMEN: gravid, fundal height 30 cm, FHT present at 145 bpm  PSYCH: normal mood and affect    Labs  No results found for this or any previous visit (from the past 12 hour(s)). Assessment         ICD-10-CM ICD-9-CM    1. 30 weeks gestation of pregnancy  Z3A.30 V22.2          Plan   47 y.o. [address] [place] [address] 30w1d ALBERT 2021 by LMP (c/w 17wk US)    1. SIUP at 30w1d  -PNL: O pos, antibody neg, Hep neg, hgb 13.1, rubella/varicella immune, RPR NR, HIV NR, hgb fractionation wnl, chlamydia, gonorrhea and trich negative, urine culture with no growth. PAP NILM, HPV neg on 21.  -Genetic testing: Declined  -Immunizations: s/p flu and Tdap  -Ultrasound with MFM (25w5d): nml female anatomy, EFW 59th%.  Cervical length normal on 17wk scan (h/o two  deliveries, on [place])  -GTT 1hr passed  -CBC at next visit (lab closed)       PREGNANCY CONCERNS  H/o Severe pre-eclampsia per [facility] records  - ASA 81mg daily  - Continue home monitoring of BP     H/o  birth per [facility] records: PTD x2 (36.0 and 36.7wk), Received [place] injections in previous pregnancies. - Patient missed too many injections, no longer able to continue Ava     H/o cholestasis in pregnancy per [facility] records. Complaining of RUQ pain without itchiness. - continue to monitor   - bile acids + LFTs if develops pruritis     Grand multip/AMA  - ASA  - consider prophylaxis with (TXA/methergine/pit) in labor     · Other  ? Continuity Provider: Addie Art  ? Pain mgmt. in labor:   ? Feeding:   ? Circ:   ? Social:         Orders Placed This Encounter    aspirin 81 mg chewable tablet     Sig: Take 81 mg by mouth daily. Labor precautions discussed, including: Regular painful contractions, lasting for greater than one hour, taking your breath away; any vaginal bleeding; any leakage of fluid; or absent or decreased fetal movement. Call M.D. on call if any of these symptoms or signs occur. I have discussed the diagnosis with the patient and the intended plan as seen in the above orders. The patient has received an after-visit summary and questions were answered concerning future plans. I have discussed medication side effects and warnings with the patient as well. Informed pt to return to the office or go to the ER if she experiences vaginal bleeding, vaginal discharge, leaking of fluid, pelvic cramping.     Pt seen and discussed with Dr. Alayna Kaiser (attending physician)    Deric Fields MD  Family Medicine Resident

## 2021-05-17 NOTE — PROGRESS NOTES
Harpal Jerry is a 47 y.o. female    Chief Complaint   Patient presents with    Routine Prenatal Visit     Patient is 30 weeks and 1 day. She is not having vaginal bleeding or discharge. She is taking her prenatal vitamins. She is having fetal movement. No contractions. patient states that since 2 weeks ago she started having alot of cramps in both of her feet. No other concerns. 1. Have you been to the ER, urgent care clinic since your last visit? Hospitalized since your last visit? No  M  2. Have you seen or consulted any other health care providers outside of the [address] [place] [address] since your last visit? Include any pap smears or colon screening. No      Visit Vitals  BP 96/60 (BP 1 Location: Right upper arm, BP Patient Position: Sitting)   Pulse 61   Temp 98.7 °F (37.1 °C) (Oral)   Resp 16   Ht 4' 8.5\" (1.435 m)   Wt 147 lb 6.4 oz (66.9 kg)   SpO2 96%   BMI 32.46 kg/m²           Health Maintenance Due   Topic Date Due    Hepatitis C Screening  Never done    COVID-19 Vaccine (1) Never done         Medication Reconciliation completed, changes noted.   Please  Update medication list.  Vaginal Bleeding: No  Vaginal discharge: No  Fetal movement: Yes  Contractions: No   Prenatal Vitamins: yes

## 2021-05-28 ENCOUNTER — TELEPHONE (OUTPATIENT)
Dept: FAMILY MEDICINE CLINIC | Age: 41
End: 2021-05-28

## 2021-05-28 NOTE — TELEPHONE ENCOUNTER
[facility] with [facility]  calling for nurse Osvaldo Saenz. Notes that he spoke with pt in that  she is stating that she hasn't been getting medication since 4/7/21. He states they have been shipping medication to office so he's confused in asking what is going on. Call him at ext [phone] Sonnie Runner if not available as he states it's holiday.  You can try ext [phone] for [facility]    Ph [phone]

## 2021-05-28 NOTE — TELEPHONE ENCOUNTER
Tried [employer] and he was unavailable. Could not leave voicemail. If he calls back please let him know patient discontinued injections due to noncompliance.

## 2021-06-06 NOTE — PROGRESS NOTES
Return OB Visit       Subjective:   Omer Stager 47 y.o. Rebeca Raya   ALBERT: 7/25/2021, Date entered prior to episode creation  GA: 33w1d. States she does not have abdominal pain, chest pain, contractions, fever, headache, nausea and vomiting, pelvic pressure, right upper quadrant pain, shortness of breath, swelling, vaginal bleeding, vaginal leaking of fluid  and visual disturbances, + fetal movement. She is taking PNV and she is eating healthy. Allergies- reviewed:   No Known Allergies  Medications- reviewed:   Current Outpatient Medications   Medication Sig    aspirin 81 mg chewable tablet Take 81 mg by mouth daily.  prenatal multivit-ca-min-fe-fa tab Take  by mouth. Current Facility-Administered Medications   Medication Dose Route Frequency    HYDROXYprogesterone (PF) (MAIA) injection 250 mg  250 mg IntraMUSCular Q7D     Past Medical History- reviewed:  No past medical history on file. Past Surgical History- reviewed:   No past surgical history on file. Social History- reviewed:  Social History     Socioeconomic History    Marital status: SINGLE     Spouse name: Not on file    Number of children: Not on file    Years of education: Not on file    Highest education level: Not on file   Occupational History    Not on file   Tobacco Use    Smoking status: Never Smoker    Smokeless tobacco: Never Used   Substance and Sexual Activity    Alcohol use: No    Drug use: Not on file    Sexual activity: Not on file   Other Topics Concern    Not on file   Social History Narrative    Not on file     Social Determinants of Health     Financial Resource Strain:     Difficulty of Paying Living Expenses:    Food Insecurity:     Worried About Running Out of Food in the Last Year:     [address] in the Last Year:    Transportation Needs:     Lack of Transportation (Medical):      Lack of Transportation (Non-Medical):    Physical Activity:     Days of Exercise per Week:     Minutes of Exercise per Session:    Stress:     Feeling of Stress :    Social Connections:     Frequency of Communication with Friends and Family:     Frequency of Social Gatherings with Friends and Family:     Attends Muslim Services:     Active Member of Clubs or Organizations:     Attends Club or Organization Meetings:     Marital Status:    Intimate Partner Violence:     Fear of Current or Ex-Partner:     Emotionally Abused:     Physically Abused:     Sexually Abused:      Immunizations- reviewed:   Immunization History   Administered Date(s) Administered    Influenza Vaccine (Quad) PF (>6 Mo Flulaval, Fluarix, and >3 Yrs Afluria, Fluzone 17897) 2021    Influenza Vaccine Intradermal PF 2017, 2018    Tdap 2015, 2021     Objective:     Visit Vitals  BP 97/63 (BP 1 Location: Right arm, BP Patient Position: Sitting, BP Cuff Size: Adult)   Pulse 67   Temp 98.5 °F (36.9 °C) (Oral)   Resp 18   Ht 4' 8.5\" (1.435 m)   Wt 148 lb (67.1 kg)   LMP 10/18/2020 (Exact Date)   SpO2 97%   BMI 32.60 kg/m²       Physical Exam:  GENERAL APPEARANCE: alert, well appearing, in no apparent distress  ABDOMEN: gravid, fundal height 34 cm, FHT present at 145 bpm  PSYCH: normal mood and affect    Labs  No results found for this or any previous visit (from the past 12 hour(s)). Plan   47 y.o. Jorge Narvaez  33w1d ALBERT 2021, Date entered prior to episode creation here for return OB visit       SIUP   - PNL: O pos, antibody neg, Hep neg, hgb 13.1, rubella/varicella immune, RPR NR, HIV NR, hgb fractionation wnl, chlamydia/gonorrhea negative, urine culture NG. PAP NILM, HPV neg.  S/p flu and Tdap, Passed 1 hr GTT.   - Genetic testing: Declined  - Ultrasound: nml female anatomy, EFW 59th%. Cervical length normal on 17wk scan (h/o two  deliveries, on Ava)    Pregnancy Problems:  - Grand multip: consider prophylactic agent in labor   - PTD: per [facility] records PTD x2 (36.0 and 36.7wk), was on Ava but has missed too many doses so it was stopped. MFM for CLS has been normal. Pt curious as to why she is not receiving Ava anymore. Made aware that she missed two appointments on 3/23 and 3/30 and per clinic policy we do not administer [place] if 2 appointments have been missed due to increased risk of  birth  - Hx severe preE: ASA    - Hx cholestasis     Today's visit:   - Recheck CBC today    Follow up visit:  in person with Dr. Alisha Ramirse This Encounter    CBC W/O DIFF     Standing Status:   Future     Standing Expiration Date:   2021     Labor precautions discussed, including: Regular painful contractions, lasting for greater than one hour, taking your breath away; any vaginal bleeding; any leakage of fluid; or absent or decreased fetal movement. Call M.D. on call if any of these symptoms or signs occur. I have discussed the diagnosis with the patient and the intended plan as seen in the above orders. The patient has received an after-visit summary and questions were answered concerning future plans. I have discussed medication side effects and warnings with the patient as well. Informed pt to return to the office or go to the ER if she experiences vaginal bleeding, vaginal discharge, leaking of fluid, pelvic cramping.     Pt discussed with Dr. Breana Malone  (attending physician)    Pablo Vega DO  Family Medicine Resident

## 2021-06-07 ENCOUNTER — ROUTINE PRENATAL (OUTPATIENT)
Dept: FAMILY MEDICINE CLINIC | Age: 41
End: 2021-06-07
Payer: MEDICAID

## 2021-06-07 VITALS
TEMPERATURE: 98.5 F | OXYGEN SATURATION: 97 % | DIASTOLIC BLOOD PRESSURE: 63 MMHG | WEIGHT: 148 LBS | BODY MASS INDEX: 31.93 KG/M2 | HEIGHT: 57 IN | RESPIRATION RATE: 18 BRPM | HEART RATE: 67 BPM | SYSTOLIC BLOOD PRESSURE: 97 MMHG

## 2021-06-07 DIAGNOSIS — Z87.51 HISTORY OF PRETERM DELIVERY: ICD-10-CM

## 2021-06-07 DIAGNOSIS — Z64.1 GRAND MULTIPARITY: ICD-10-CM

## 2021-06-07 DIAGNOSIS — Z3A.33 33 WEEKS GESTATION OF PREGNANCY: Primary | ICD-10-CM

## 2021-06-07 DIAGNOSIS — Z34.90 PRENATAL CARE, ANTEPARTUM: ICD-10-CM

## 2021-06-07 DIAGNOSIS — Z87.59 HISTORY OF SEVERE PRE-ECLAMPSIA: ICD-10-CM

## 2021-06-07 LAB
ERYTHROCYTE [DISTWIDTH] IN BLOOD BY AUTOMATED COUNT: 13.3 % (ref 11.5–14.5)
HCT VFR BLD AUTO: 37.1 % (ref 35–47)
HGB BLD-MCNC: 11.3 G/DL (ref 11.5–16)
MCH RBC QN AUTO: 27.7 PG (ref 26–34)
MCHC RBC AUTO-ENTMCNC: 30.5 G/DL (ref 30–36.5)
MCV RBC AUTO: 90.9 FL (ref 80–99)
NRBC # BLD: 0 K/UL (ref 0–0.01)
NRBC BLD-RTO: 0 PER 100 WBC
PLATELET # BLD AUTO: 277 K/UL (ref 150–400)
PMV BLD AUTO: 11.1 FL (ref 8.9–12.9)
RBC # BLD AUTO: 4.08 M/UL (ref 3.8–5.2)
WBC # BLD AUTO: 9.2 K/UL (ref 3.6–11)

## 2021-06-07 PROCEDURE — 0502F SUBSEQUENT PRENATAL CARE: CPT | Performed by: STUDENT IN AN ORGANIZED HEALTH CARE EDUCATION/TRAINING PROGRAM

## 2021-06-07 NOTE — PROGRESS NOTES
I reviewed with the resident the medical history and the resident's findings on the physical examination. I discussed with the resident the patient's diagnosis and concur with the plan. 39yo [id number] @ 33w1d by LMP  1. IUP: RH pos, NIPT declined, GTT wnl, will need 3rd tri CBC, s/p tdap   2. AMA  3.  Grand multip: consider prophylactic agent in labor   4. PTD: per [facility] records PTD x2 (36.0 and 36.7wk), was on Ava but has missed too many doses so will not continue it. MFM for CLS - normal.  5.  Hx severe preE: ASA    6.   Hx cholestasis

## 2021-06-07 NOTE — PROGRESS NOTES
Identified Patient with two Patient identifiers (Name and ). Two Patient Identifiers confirmed. Reviewed record in preparation for visit and have obtained necessary documentation. Chief Complaint   Patient presents with    Routine Prenatal Visit     33w1d       Visit Vitals  BP 97/63 (BP 1 Location: Right arm, BP Patient Position: Sitting, BP Cuff Size: Adult)   Pulse 67   Temp 98.5 °F (36.9 °C) (Oral)   Resp 18   Ht 4' 8.5\" (1.435 m)   Wt 148 lb (67.1 kg)   SpO2 97%   BMI 32.60 kg/m²       1. Have you been to the ER, urgent care clinic since your last visit? Hospitalized since your last visit? No    2. Have you seen or consulted any other health care providers outside of the [address] [place] [address] since your last visit? Include any pap smears or colon screening.  No

## 2021-06-21 ENCOUNTER — ROUTINE PRENATAL (OUTPATIENT)
Dept: FAMILY MEDICINE CLINIC | Age: 41
End: 2021-06-21
Payer: MEDICAID

## 2021-06-21 VITALS
HEART RATE: 65 BPM | BODY MASS INDEX: 31.93 KG/M2 | DIASTOLIC BLOOD PRESSURE: 74 MMHG | HEIGHT: 57 IN | WEIGHT: 148 LBS | SYSTOLIC BLOOD PRESSURE: 109 MMHG | RESPIRATION RATE: 16 BRPM | TEMPERATURE: 98 F | OXYGEN SATURATION: 99 %

## 2021-06-21 DIAGNOSIS — Z3A.35 35 WEEKS GESTATION OF PREGNANCY: Primary | ICD-10-CM

## 2021-06-21 PROCEDURE — 0502F SUBSEQUENT PRENATAL CARE: CPT | Performed by: STUDENT IN AN ORGANIZED HEALTH CARE EDUCATION/TRAINING PROGRAM

## 2021-06-21 NOTE — PROGRESS NOTES
Return OB Visit       Subjective:   Betito Wolf 47 y.o. Olga Cornejo  at [facility] MOLLY FRY:  35w1d ALBERT: 7/25/2021, Date entered prior to episode creation who presents today for her routine prenatal visit. Pregnancy complicated by grand multip, PTD x2, Hx severe PreE and hx of cholestasis    States her BP is normal at home and is in the low 871K systolic. Did not bring log today. ROS:   She is feeling her baby move. She denies vaginal bleeding, discharge or loss of fluid. She denies nausea, vomiting, severe abdominal pain or cramping. States she sometimes gets very mild abdominal contraction type pain that resolves spontaneously after a few seconds. She denies dysuria. She denies headaches, dizziness or vision changes. She denies excessive swelling of extremities      Allergies- reviewed:   No Known Allergies  Medications- reviewed:   Current Outpatient Medications   Medication Sig    aspirin 81 mg chewable tablet Take 81 mg by mouth daily.  prenatal multivit-ca-min-fe-fa tab Take  by mouth. Current Facility-Administered Medications   Medication Dose Route Frequency    HYDROXYprogesterone (PF) (MAIA) injection 250 mg  250 mg IntraMUSCular Q7D     Past Medical History- reviewed:  No past medical history on file. Past Surgical History- reviewed:   No past surgical history on file.   Social History- reviewed:  Social History     Socioeconomic History    Marital status: SINGLE     Spouse name: Not on file    Number of children: Not on file    Years of education: Not on file    Highest education level: Not on file   Occupational History    Not on file   Tobacco Use    Smoking status: Never Smoker    Smokeless tobacco: Never Used   Substance and Sexual Activity    Alcohol use: No    Drug use: Not on file    Sexual activity: Not on file   Other Topics Concern    Not on file   Social History Narrative    Not on file     Social Determinants of Health     Financial Resource Strain:     Difficulty of Paying Living Expenses:    Food Insecurity:     Worried About Running Out of Food in the Last Year:     [address] in the Last Year:    Transportation Needs:     Lack of Transportation (Medical):  Lack of Transportation (Non-Medical):    Physical Activity:     Days of Exercise per Week:     Minutes of Exercise per Session:    Stress:     Feeling of Stress :    Social Connections:     Frequency of Communication with Friends and Family:     Frequency of Social Gatherings with Friends and Family:     Attends Rastafari Services:     Active Member of Clubs or Organizations:     Attends Club or Organization Meetings:     Marital Status:    Intimate Partner Violence:     Fear of Current or Ex-Partner:     Emotionally Abused:     Physically Abused:     Sexually Abused:      Immunizations- reviewed:   Immunization History   Administered Date(s) Administered    Influenza Vaccine (Quad) PF (>6 Mo Flulaval, Fluarix, and >3 Yrs Afluria, Fluzone 42714) 02/01/2021    Influenza Vaccine Intradermal PF 11/02/2017, 11/30/2018    Tdap 02/22/2015, 05/03/2021         Objective:     Visit Vitals  /74 (BP 1 Location: Left upper arm, BP Patient Position: Sitting, BP Cuff Size: Adult)   Pulse 65   Temp 98 °F (36.7 °C) (Oral)   Resp 16   Ht 4' 8.5\" (1.435 m)   Wt 148 lb (67.1 kg)   LMP 10/18/2020 (Exact Date)   SpO2 99%   BMI 32.60 kg/m²       Physical Exam:  GENERAL APPEARANCE: alert, well appearing, in no apparent distress  ABDOMEN: FHT present, 140 bpm  BACK: no CVA tenderness  UTERUS: gravid, 37 cm  EXTREMITIES: no redness or tenderness in the calves or thighs, no edema  NEUROLOGICAL: alert, oriented, normal speech, no focal findings or movement disorder noted  PELVIC: examination not indicated. Labs  No results found for this or any previous visit (from the past 12 hour(s)).       Assessment         ICD-10-CM ICD-9-CM    1. 35 weeks gestation of pregnancy  Z3A.35 V22.2          Plan   47 y.o. Jazmine Monegasque  35w1d ALBERT 2021, Date entered prior to episode creation here for return OB visit     SIUP   - PNL: O pos, antibody neg, Hep neg, hgb 13.1, rubella/varicella immune, RPR NR, HIV NR, hgb fractionation wnl, chlamydia/gonorrhea negative, urine culture NG. PAP NILM, HPV neg. S/p flu and Tdap, Passed 1 hr GTT. - Third trimester CBC with hgb wnl for pregnancy. - Genetic testing: Declined  - Ultrasound: nml female anatomy, EFW 59th%. Cervical length normal on 17wk scan (h/o two  deliveries, on [place]visa)     Pregnancy Problems:  - Grand multip: consider prophylactic agent in labor   - PTD: per [facility] records PTD x2 (36.0 and 36.7wk), was on [place] but has missed too many doses so it was stopped.  MFM for CLS has been normal. Pt curious as to why she is not receiving [place]a anymore. Made aware that she missed two appointments on 3/23 and 3/30 and per clinic policy we do not administer Salvisa if 2 appointments have been missed due to increased risk of  birth  - Hx severe preE: ASA . Patient is to bring BP log to next appointment. - Hx cholestasis            No orders of the defined types were placed in this encounter. Labor precautions discussed, including: Regular painful contractions, lasting for greater than one hour, taking your breath away; any vaginal bleeding; any leakage of fluid; or absent or decreased fetal movement. Call M.D. on call if any of these symptoms or signs occur. I have discussed the diagnosis with the patient and the intended plan as seen in the above orders. The patient has received an after-visit summary and questions were answered concerning future plans. I have discussed medication side effects and warnings with the patient as well. Informed pt to return to the office or go to the ER if she experiences vaginal bleeding, vaginal discharge, leaking of fluid, pelvic cramping.     Pt seen and discussed with Dr. Marie Boss (attending physician)    Rogelio Hilton MD  Family Medicine Resident

## 2021-06-21 NOTE — PROGRESS NOTES
I reviewed with the resident the medical history and the resident's findings on the physical examination. I discussed with the resident the patient's diagnosis and concur with the plan. 41yo [id number] @ 35w1d by LMP  1. IUP: RH pos, NIPT declined, GTT wnl, CBC wnl, s/p tdap   2. AMA  3.  Grand multip: consider prophylactic agent in labor   4. PTD: per [facility] records PTD x2 (36.0 and 36.7wk), was on [place] but has missed too many doses so will not continue it. MFM for CLS - normal.  5.  Hx severe preE: ASA    6.   Hx cholestasis

## 2021-06-21 NOTE — PROGRESS NOTES
Chief Complaint   Patient presents with    Routine Prenatal Visit     she is 35w 1d today     Vaginal bleeding: no  LOF: no  Abnormal discharge: no  Pain: yes - she has been having a lot of lower abdominal pain, as well as round ligament pain. She does takes tylenol for the pain  Contractions: yes   Fetal movement: yes  Taking PNV: yes    1. Have you been to the ER, urgent care clinic since your last visit? Hospitalized since your last visit? No    2. Have you seen or consulted any other health care providers outside of the [address] [place] [address] since your last visit? Include any pap smears or colon screening.  No

## 2021-06-28 NOTE — PROGRESS NOTES
Return OB Visit       Subjective:   Carl Hobbs 47 y.o. Jazmine Flores   ALBERT: 2021, GA:  36w3d. Pregnancy complicated by AMA, grand multiparity, PTD x2 (4th and 5th pregnancies at 36 wks), hx of severe PreE, and hx of cholestasis. Due to a language barrier, an  was present during the history-taking and subsequent discussion (and for part of the physical exam) with this patient ([facility]  #[id number]). LOF: no  Vaginal bleeding: no  Fetal movement (after 20 weeks): yes  Contractions: no  Taking prenatal vitamins and aspirin: yes  BP log at home: Has not been checking at home    Concerns today: Having some pressure/abdominal discomfort at night. Some SOB at night, feels like her baby is pushing up on her chest.     Pt denies fever, chills, HA, vision disturbances, RUQ pain, chest pain, N/V/D, constipation, urinary problems, foul smelling vaginal discharge. OB History    Para Term  AB Living   7 6 4 2 0 6   SAB TAB Ectopic Molar Multiple Live Births                    # Outcome Date GA Lbr Joe/2nd Weight Sex Delivery Anes PTL Lv   7 Current            6 Term 17 39w0d  6 lb (2.722 kg) M Vag-Spont      5  03/31/15 36w5d  5 lb 15.2 oz (2.7 kg) F Vag-Spont      4  10/26/07 36w0d  6 lb 6.4 oz (2.903 kg) M Vag-Spont      3 Term 05 40w0d  7 lb 1.6 oz (3.221 kg) F Vag-Spont      2 Term 03 40w0d  6 lb 3.2 oz (2.812 kg) F Vag-Spont      1 Term 01 40w0d  7 lb 14.4 oz (3.583 kg) M Vag-Spont         Obstetric Comments   Pt states she had one  delivery in 10/2007 @ 35 weeks. Allergies- reviewed:   No Known Allergies  Medications- reviewed:   Current Outpatient Medications   Medication Sig    aspirin 81 mg chewable tablet Take 81 mg by mouth daily.  prenatal multivit-ca-min-fe-fa tab Take  by mouth.      Current Facility-Administered Medications   Medication Dose Route Frequency    HYDROXYprogesterone (PF) (MAIA) injection 250 mg  250 mg IntraMUSCular Q7D     Past Medical History- reviewed:  History reviewed. No pertinent past medical history. Past Surgical History- reviewed:   History reviewed. No pertinent surgical history. Social History- reviewed:  Social History     Socioeconomic History    Marital status: SINGLE     Spouse name: Not on file    Number of children: Not on file    Years of education: Not on file    Highest education level: Not on file   Occupational History    Not on file   Tobacco Use    Smoking status: Never Smoker    Smokeless tobacco: Never Used   Substance and Sexual Activity    Alcohol use: No    Drug use: Not on file    Sexual activity: Not on file   Other Topics Concern    Not on file   Social History Narrative    Not on file     Social Determinants of Health     Financial Resource Strain:     Difficulty of Paying Living Expenses:    Food Insecurity:     Worried About Running Out of Food in the Last Year:     [address] in the Last Year:    Transportation Needs:     Lack of Transportation (Medical):      Lack of Transportation (Non-Medical):    Physical Activity:     Days of Exercise per Week:     Minutes of Exercise per Session:    Stress:     Feeling of Stress :    Social Connections:     Frequency of Communication with Friends and Family:     Frequency of Social Gatherings with Friends and Family:     Attends Zoroastrianism Services:     Active Member of Clubs or Organizations:     Attends Club or Organization Meetings:     Marital Status:    Intimate Partner Violence:     Fear of Current or Ex-Partner:     Emotionally Abused:     Physically Abused:     Sexually Abused:      Immunizations- reviewed:   Immunization History   Administered Date(s) Administered    Influenza Vaccine (Quad) PF (>6 Mo Flulaval, Fluarix, and >3 Yrs [place], Fluzone 93591) 02/01/2021    Influenza Vaccine Intradermal PF 11/02/2017, 11/30/2018    Tdap 02/22/2015, 05/03/2021       Objective: Visit Vitals  /71 (BP 1 Location: Left arm, BP Patient Position: Sitting, BP Cuff Size: Adult)   Pulse 72   Temp 98.1 °F (36.7 °C) (Temporal)   Resp 16   Ht 4' 8.5\" (1.435 m)   Wt 148 lb 6.4 oz (67.3 kg)   LMP 10/18/2020 (Exact Date)   SpO2 99%   BMI 32.68 kg/m²       Physical Exam:  GENERAL APPEARANCE: alert, well appearing, in no apparent distress  ABDOMEN: gravid, Fundal height 38 cm, FHT present at 135 bpm  Cervical Exam: fingertip/90/high  Extremities: Trace edema BLLE  PSYCH: normal mood and affect     Labs  No results found for this or any previous visit (from the past 12 hour(s)). Assessment       ICD-10-CM ICD-9-CM    1. Supervision of high risk pregnancy, antepartum  O09.90 V23.9    2. 36 weeks gestation of pregnancy  Z3A.36 V22.2    3. Multigravida of advanced maternal age in third trimester  O09.523 65.56    4. History of  delivery  Z87.51 V13.21          Plan   47 y.o.  36w3d ALBERT 2021, Date entered prior to episode creation here for return OB visit     1. SIUP at 36w3d  -PNL:O+, Ab neg, Hgb fractionation wnl, HepBSAg neg, HIV/RPR NR, rubella/varicella immune, GC/CT neg, urine Cx no growth, Pap NILM/HPV neg.  - Declined genetic testing   - S/p flu and Tdap   - Passed 1' GTT  - 3rd trimester Hgb wnl   - Ultrasounds:   - 21: 17w4d. Normal female anatomy, EFW AGA, normal cervical length Cervical length normal.   - 21: 25w5d. EFW 59th%.   - GBS culture collected today   - Cervical check - fingertip/90%/high  - US to confirm position - butterfly US used, confirmed vertex   - F/U in 1 week - next appointment 2021 at 3:35 PM with Dr. Balta Lai     Pregnancy Problems:  - AMA: On aspirin 81 mg daily  - Grand multip: consider prophylactic agent in labor   - Hx PTD: per [facility] records PTD x2 (36.0 and 36.7wk), was on Ava but has missed too many doses so it was stopped.  MFM for CLS has been normal. Pt curious as to why she is not receiving [place] anymore.  Made aware that she missed two appointments on 3/23 and 3/30 and per clinic policy we do not administer Ava if 2 appointments have been missed due to increased risk of  birth  - Hx severe preE: On ASA. BP well controlled here today. Has not been checking BP at home. Encouraged to start checking and to keep a log.   - Hx cholestasis: No symptoms at this time. BIRTH PLAN  · Continuity Provider: Davis Marte     No orders of the defined types were placed in this encounter. Labor precautions discussed, including: Regular painful contractions, lasting for greater than one hour, taking your breath away; any vaginal bleeding; any leakage of fluid; or absent or decreased fetal movement. Call MD on call if any of these symptoms or signs occur. I have discussed the diagnosis with the patient and the intended plan as seen in the above orders. The patient has received an after-visit summary and questions were answered concerning future plans. I have discussed medication side effects and warnings with the patient as well. Informed pt to return to the office or go to the ER if she experiences vaginal bleeding, vaginal discharge, leaking of fluid, pelvic cramping.     Pt seen and discussed with Dr. Breana Malone (attending physician)    Maria A Kaur DO  Family Medicine Resident

## 2021-06-28 NOTE — PATIENT INSTRUCTIONS
Semanas 34 a 36 de rowell embarazo: Instrucciones de cuidado  Weeks 34 to 36 of Your Pregnancy: Care Instructions  Instrucciones de cuidado    A estas [place], rowell bebé y rowell abdomen habrán crecido considerablemente. Rosemarie es [place] de artem a talya. Los pulmones de rowell bebé están rosemarie listos para respirar aire. Los huesos de la breanna de rowell bebé ahora son bastante firmes ascencion para protegerla dimitris se mantienen lo suficientemente blandos ascencion para atravesar el canal de [place]. Es posible que sienta entusiasmo, dom, ansiedad o miedo. Quizá se pregunte cómo se dará cuenta de si está en trabajo de parto o qué esperar en marlon momento. Trate de ser flexible con alberto expectativas respecto del nacimiento. Dado que cada nacimiento es diferente, no hay manera de saber exactamente cómo será rowell parto. Esta hoja de cuidados la ayudará a saber qué esperar y cómo prepararse. Le podría facilitar el parto. Si todavía no le maier aplicado la vacuna Tdap (tétanos, difteria y tos [place]) minerva catalina [place], hable con rowell médico acerca de aplicársela. Becki [place] a proteger a rowell recién nacido contra la infección por tos ferina. En la semana 36, a la mayoría de las mujeres se les hace miryam prueba de estreptococos del alyssa B (GBS, por alberto siglas en inglés). Los estreptococos del alyssa B son bacterias comunes que pueden vivir en la vagina y el recto. Pueden hacer que rowell bebé se enferme después del parto. Si el resultado es positivo, usted recibirá antibióticos minerva el trabajo de [place]. Los medicamentos evitarán que rowell bebé contraiga las bacterias. La atención de seguimiento es miryam parte clave de rowell tratamiento y seguridad. Asegúrese de hacer y acudir a todas las citas, y llame a rowell médico si está teniendo problemas. También es miryam buena idea saber los resultados de alberto exámenes y mantener miryam lista de los medicamentos que paresh. Cómo puede cuidarse en el hogar?   Aprenda sobre las alternativas para aliviar el dolor  · El dolor se [place] de modo diferente en cada rosie. Hable con rowell médico acerca de alberto sentimientos sobre el dolor. · Puede elegir entre varias formas de aliviar el dolor. Estas incluyen medicamentos o técnicas de respiración, así ascencion medidas para estar cómoda. Usted puede utilizar más de [employer] opción. · Si elige un analgésico (medicamento para el dolor) minerva el trabajo de [place], hable con rowell médico acerca de alberto opciones. Algunos medicamentos reducen la ansiedad y [place] [facility] a aliviar parte del dolor. Otros adormecen la parte inferior del cuerpo para que no sienta dolor. · Asegúrese de decirle a rowell médico acerca de rowell elección de analgésico antes de empezar el trabajo de parto o muy temprano en el [place] de [place]. Es posible que pueda cambiar de parecer a medida que avanza el [place]. · Liana vez se duerme a miryam rosie con medicamentos administrados a través de miryam máscara o por vía intravenosa (IV). Trabajo de parto y [place]  · La primera etapa del [place] de parto se divide en caprice fases: Sara Kin y de transición. ? La mayoría de las mujeres experimentan la fase latente del [place] de parto en alberto hogares. Usted puede [employer] o descansar, comer refrigerios livianos, beber líquidos sonia y comenzar a contar las contracciones. ? Cuando advierta que se le vuelve difícil hablar minerva miryam contracción, es posible que esté por pasar a la fase activa. Minerva la fase Jerie Scales, debería ir al hospital si no está allí aún. ? Usted está en la fase activa cuando tiene contracciones cada 3 o 4 minutos y russ alrededor de 60 segundos. El lance uterino comienza a abrirse con más rapidez.  ? Si se le rompe la lewis, las contracciones serán más intensas y más frecuentes. ? Minerva la fase de transición, el lance uterino se estira y las contracciones se producen con [place]. ? Clance Lipoma tenga deseos de pujar, sin embargo es posible que el lance uterino aún no esté preparado.  El [employer] dirá cuándo pujar.  · La segunda etapa comienza cuando el lance uterino se abre por completo y usted está lista para pujar. ? Las contracciones son muy intensas a fin de empujar al bebé por el canal de parto. ? Sentirá la necesidad de pujar. Podría sentir ascencion si tuviera ganas de evacuar el intestino. ? José Luis Skipper entrenen a [employer]. Estas contracciones serán muy intensas dimitris no ocurrirán con tanta frecuencia. Puede descansar un poco entre contracciones. ? Es posible que esté sensible e irritable. Es posible que no se dé cuenta de lo que pasa a rowell alrededor. ? Un último esfuerzo y habrá nacido rowell bebé. · La tercera etapa ocurre cuando con unas cuantas contracciones más se expulsa la placenta. [place] puede durar 30 minutos o menos. · La cuarta etapa es la de recuperación. Es posible que se sienta abrumada con las emociones y exhausta dimitris alerta. Radha es un buen momento para comenzar el amamantamiento. Dónde puede encontrar más información en inglés? Vaya a http://www.gray.com/  [place] [id number] en la búsqueda para aprender más acerca de \"Semanas 34 a 39 de rowell embarazo: Instrucciones de cuidado. \"  Revisado: 8 octubre, 2020               Versión del contenido: 12.8  © 5094-8637 Healthwise, Incorporated. Las instrucciones de cuidado fueron adaptadas bajo licencia por [employer] (which disclaims liability or warranty for this information). Si usted tiene [place] afección médica o sobre estas instrucciones, siempre pregunte a rowell profesional de natividad. Healthwise, Incorporated niega toda garantía o responsabilidad por rowell uso de esta información.

## 2021-06-30 ENCOUNTER — ROUTINE PRENATAL (OUTPATIENT)
Dept: FAMILY MEDICINE CLINIC | Age: 41
End: 2021-06-30
Payer: MEDICAID

## 2021-06-30 VITALS
BODY MASS INDEX: 32.01 KG/M2 | HEART RATE: 72 BPM | OXYGEN SATURATION: 99 % | RESPIRATION RATE: 16 BRPM | WEIGHT: 148.4 LBS | SYSTOLIC BLOOD PRESSURE: 115 MMHG | HEIGHT: 57 IN | TEMPERATURE: 98.1 F | DIASTOLIC BLOOD PRESSURE: 71 MMHG

## 2021-06-30 DIAGNOSIS — Z3A.36 36 WEEKS GESTATION OF PREGNANCY: ICD-10-CM

## 2021-06-30 DIAGNOSIS — O09.523 MULTIGRAVIDA OF ADVANCED MATERNAL AGE IN THIRD TRIMESTER: ICD-10-CM

## 2021-06-30 DIAGNOSIS — Z87.51 HISTORY OF PRETERM DELIVERY: ICD-10-CM

## 2021-06-30 DIAGNOSIS — O09.90 SUPERVISION OF HIGH RISK PREGNANCY, ANTEPARTUM: Primary | ICD-10-CM

## 2021-06-30 PROCEDURE — 0502F SUBSEQUENT PRENATAL CARE: CPT | Performed by: STUDENT IN AN ORGANIZED HEALTH CARE EDUCATION/TRAINING PROGRAM

## 2021-06-30 NOTE — PROGRESS NOTES
I reviewed with the resident the medical history and the resident's findings on the physical examination. I discussed with the resident the patient's diagnosis and concur with the plan. 39yo [id number] @ 36w3d by LMP  1. IUP: RH pos, NIPT declined, GTT wnl, CBC wnl, s/p tdap   2. AMA  3.  Grand multip: consider prophylactic agent in labor   4. PTD: per [facility] records PTD x2 (36.0 and 36.7wk), was on [place] but has missed too many doses so will not continue it. MFM for CLS - normal.  5.  Hx severe preE: ASA    6.   Hx cholestasis

## 2021-06-30 NOTE — PROGRESS NOTES
Chief Complaint   Patient presents with    Routine Prenatal Visit     36w3d. +FM, no vaginal bleeding or LOF. Does c/o having more pelvic pain at night, but feels fine in the am.  GBS today     1. Have you been to the ER, urgent care clinic since your last visit? Hospitalized since your last visit? no    2. Have you seen or consulted any other health care providers outside of the [address] [place] [address] since your last visit? Include any pap smears or colon screening.  no

## 2021-07-03 LAB
BACTERIA SPEC CULT: NORMAL
SERVICE CMNT-IMP: NORMAL

## 2021-07-06 ENCOUNTER — ROUTINE PRENATAL (OUTPATIENT)
Dept: FAMILY MEDICINE CLINIC | Age: 41
End: 2021-07-06

## 2021-07-06 VITALS
WEIGHT: 149 LBS | RESPIRATION RATE: 17 BRPM | OXYGEN SATURATION: 98 % | HEART RATE: 63 BPM | DIASTOLIC BLOOD PRESSURE: 61 MMHG | HEIGHT: 57 IN | SYSTOLIC BLOOD PRESSURE: 105 MMHG | BODY MASS INDEX: 32.15 KG/M2 | TEMPERATURE: 97.5 F

## 2021-07-06 DIAGNOSIS — O09.90 SUPERVISION OF HIGH RISK PREGNANCY, ANTEPARTUM: Primary | ICD-10-CM

## 2021-07-06 NOTE — PROGRESS NOTES
Return OB Visit       Subjective:   Tiana Altamirano 47 y.o. Juan Pablo Ache   ALBERT: 7/25/2021, Date entered prior to episode creation  GA:  37w2d. LOF: no  Vaginal bleeding: no  Fetal movement (after 20 weeks): yes  Contractions: yes, occasionally    Takes PNV and ASA    Allergies- reviewed:   No Known Allergies  Medications- reviewed:   Current Outpatient Medications   Medication Sig    aspirin 81 mg chewable tablet Take 81 mg by mouth daily.  prenatal multivit-ca-min-fe-fa tab Take  by mouth. Current Facility-Administered Medications   Medication Dose Route Frequency    HYDROXYprogesterone (PF) (MAIA) injection 250 mg  250 mg IntraMUSCular Q7D     Past Medical History- reviewed:  No past medical history on file. Past Surgical History- reviewed:   No past surgical history on file. Social History- reviewed:  Social History     Socioeconomic History    Marital status: SINGLE     Spouse name: Not on file    Number of children: Not on file    Years of education: Not on file    Highest education level: Not on file   Occupational History    Not on file   Tobacco Use    Smoking status: Never Smoker    Smokeless tobacco: Never Used   Substance and Sexual Activity    Alcohol use: No    Drug use: Not on file    Sexual activity: Not on file   Other Topics Concern    Not on file   Social History Narrative    Not on file     Social Determinants of Health     Financial Resource Strain:     Difficulty of Paying Living Expenses:    Food Insecurity:     Worried About Running Out of Food in the Last Year:     [address] in the Last Year:    Transportation Needs:     Lack of Transportation (Medical):      Lack of Transportation (Non-Medical):    Physical Activity:     Days of Exercise per Week:     Minutes of Exercise per Session:    Stress:     Feeling of Stress :    Social Connections:     Frequency of Communication with Friends and Family:     Frequency of Social Gatherings with Friends and Family:     Attends Hindu Services:     Active Member of Clubs or Organizations:     Attends Club or Organization Meetings:     Marital Status:    Intimate Partner Violence:     Fear of Current or Ex-Partner:     Emotionally Abused:     Physically Abused:     Sexually Abused:      Immunizations- reviewed:   Immunization History   Administered Date(s) Administered    Influenza Vaccine (Quad) PF (>6 Mo Flulaval, Fluarix, and >3 Yrs Afluria, Fluzone 75319) 02/01/2021    Influenza Vaccine Intradermal PF 11/02/2017, 11/30/2018    Tdap 02/22/2015, 05/03/2021         Objective:     Visit Vitals  /61   Pulse 63   Temp 97.5 °F (36.4 °C) (Temporal)   Resp 17   Ht 4' 8.5\" (1.435 m)   Wt 149 lb (67.6 kg)   LMP 10/18/2020 (Exact Date)   SpO2 98%   BMI 32.82 kg/m²       Physical Exam:  See OB episode     Labs  No results found for this or any previous visit (from the past 12 hour(s)). Assessment         ICD-10-CM ICD-9-CM    1. Supervision of high risk pregnancy, antepartum  O09.90 V23.9          Plan   47 y.o. Cinthia Mcclure  37w2d ALBERT 7/25/2021, Date entered prior to episode creation here for return OB visit     PNL:O+, Ab neg, Hgb fractionation wnl, HepBSAg neg, HIV/RPR NR, rubella/varicella immune, GC/CT neg, urine Cx no growth, Pap NILM/HPV neg.  - Declined genetic testing   - S/p flu and Tdap   - Passed 1' GTT  - GBS neg  - 3rd trimester Hgb wnl   - Ultrasounds: 4/16/21: 25w5d. EFW 59th%. 1.SIUP at 37w2d  - Cervical check - FT/50/-3  - US to confirm position vertex a week ago  - F/U in 1 week    2)AMA: On aspirin 81 mg daily  3)Grand multip: consider prophylactic agent in labor   4)Hx PTD: per [facility] records PTD x2 (36.0 and 36.7wk), was on Ava but has missed too many doses so it was stopped.  MFM for CLS has been normal. Pt curious as to why she is not receiving Ava anymore.  Made aware that she missed two appointments on 3/23 and 3/30 and per clinic policy we do not administer [place] if 2 appointments have been missed due to increased risk of  birth  5)Hx severe preE: On ASA. BP well controlled here today. Has not been checking BP at home. Encouraged to start checking and to keep a log. 6)Hx cholestasis: No symptoms at this time.         BIRTH PLAN  ? Continuity Provider: Gavin Izquierdo       No orders of the defined types were placed in this encounter. Labor precautions discussed, including: Regular painful contractions, lasting for greater than one hour, taking your breath away; any vaginal bleeding; any leakage of fluid; or absent or decreased fetal movement. Call M.D. on call if any of these symptoms or signs occur. I have discussed the diagnosis with the patient and the intended plan as seen in the above orders. The patient has received an after-visit summary and questions were answered concerning future plans. I have discussed medication side effects and warnings with the patient as well. Informed pt to return to the office or go to the ER if she experiences vaginal bleeding, vaginal discharge, leaking of fluid, pelvic cramping.     Pt seen and discussed with Dr. Odalys Pineda (attending physician)    Dwayne Mccormick MD  Family Medicine Resident

## 2021-07-06 NOTE — PROGRESS NOTES
Chief Complaint   Patient presents with    Routine Prenatal Visit     .  37w 2 d today. No bleeding or LOF.  +FM. 1. Have you been to the ER, urgent care clinic since your last visit? Hospitalized since your last visit? No    2. Have you seen or consulted any other health care providers outside of the [address] [place] [address] since your last visit? Include any pap smears or colon screening.  No

## 2021-07-09 ENCOUNTER — TELEPHONE (OUTPATIENT)
Dept: FAMILY MEDICINE CLINIC | Age: 41
End: 2021-07-09

## 2021-07-09 NOTE — TELEPHONE ENCOUNTER
----- Message from [facility] sent at 7/9/2021  8:17 AM EDT -----  Regarding: Dr. Ruby Grant  Appointment not available    Caller's first and last name and relationship to patient (if not the patient):  N/A      Best contact number:  [phone]      Preferred date and time:   Next available      Scheduled appointment date and time:  7/12/21 at 9:30a.m. Reason for appointment:  Prenatal      Details to clarify the request:  Patient is requesting a call back to reschedule her prenatal appointment for any other day than Monday due to a scheduling conflict with another appointment for her son.       [facility]

## 2021-07-19 ENCOUNTER — TELEPHONE (OUTPATIENT)
Dept: FAMILY MEDICINE CLINIC | Age: 41
End: 2021-07-19

## 2021-07-19 NOTE — TELEPHONE ENCOUNTER
[phone]  Patient called and asked for [place]. Returned the call with ALICE. Patient cxld prenatal appt of 7/21 as saying she went to the closest hospital on 7/17 which was [facility] and delivered baby.

## 2022-03-19 PROBLEM — Z64.1 GRAND MULTIPARITY: Status: ACTIVE | Noted: 2021-02-10

## 2022-03-19 PROBLEM — O09.529 ANTEPARTUM MULTIGRAVIDA OF ADVANCED MATERNAL AGE: Status: ACTIVE | Noted: 2021-02-10

## 2022-03-20 PROBLEM — O09.90 SUPERVISION OF HIGH RISK PREGNANCY, ANTEPARTUM: Status: ACTIVE | Noted: 2021-02-10

## 2022-03-20 PROBLEM — Z87.59 HISTORY OF SEVERE PRE-ECLAMPSIA: Status: ACTIVE | Noted: 2021-02-01

## 2022-03-20 PROBLEM — Z87.51 HISTORY OF PRETERM DELIVERY: Status: ACTIVE | Noted: 2021-02-01

## 2023-12-09 ENCOUNTER — APPOINTMENT (OUTPATIENT)
Facility: HOSPITAL | Age: 43
End: 2023-12-09
Payer: COMMERCIAL

## 2023-12-09 ENCOUNTER — HOSPITAL ENCOUNTER (EMERGENCY)
Facility: HOSPITAL | Age: 43
Discharge: HOME | End: 2023-12-09
Attending: EMERGENCY MEDICINE
Payer: COMMERCIAL

## 2023-12-09 VITALS
TEMPERATURE: 98 F | DIASTOLIC BLOOD PRESSURE: 78 MMHG | RESPIRATION RATE: 16 BRPM | SYSTOLIC BLOOD PRESSURE: 141 MMHG | WEIGHT: 131.39 LBS | HEART RATE: 78 BPM | HEIGHT: 55 IN | OXYGEN SATURATION: 99 % | BODY MASS INDEX: 30.41 KG/M2

## 2023-12-09 DIAGNOSIS — R10.84 GENERALIZED ABDOMINAL PAIN: Primary | ICD-10-CM

## 2023-12-09 LAB
ALBUMIN SERPL-MCNC: 4 G/DL (ref 3.5–5)
ALBUMIN/GLOB SERPL: 0.9 (ref 1.1–2.2)
ALP SERPL-CCNC: 74 U/L (ref 45–117)
ALT SERPL-CCNC: 22 U/L (ref 12–78)
ANION GAP SERPL CALC-SCNC: 3 MMOL/L (ref 5–15)
APPEARANCE UR: CLEAR
AST SERPL-CCNC: 20 U/L (ref 15–37)
BACTERIA URNS QL MICRO: NEGATIVE /HPF
BASOPHILS # BLD: 0 K/UL (ref 0–0.1)
BASOPHILS NFR BLD: 0 % (ref 0–1)
BILIRUB SERPL-MCNC: 1.1 MG/DL (ref 0.2–1)
BILIRUB UR QL: NEGATIVE
BUN SERPL-MCNC: 14 MG/DL (ref 6–20)
BUN/CREAT SERPL: 25 (ref 12–20)
CALCIUM SERPL-MCNC: 8.8 MG/DL (ref 8.5–10.1)
CHLORIDE SERPL-SCNC: 108 MMOL/L (ref 97–108)
CO2 SERPL-SCNC: 27 MMOL/L (ref 21–32)
COLOR UR: ABNORMAL
COMMENT:: NORMAL
CREAT SERPL-MCNC: 0.55 MG/DL (ref 0.55–1.02)
DIFFERENTIAL METHOD BLD: NORMAL
EOSINOPHIL # BLD: 0.2 K/UL (ref 0–0.4)
EOSINOPHIL NFR BLD: 3 % (ref 0–7)
EPITH CASTS URNS QL MICRO: ABNORMAL /LPF
ERYTHROCYTE [DISTWIDTH] IN BLOOD BY AUTOMATED COUNT: 13.2 % (ref 11.5–14.5)
GLOBULIN SER CALC-MCNC: 4.4 G/DL (ref 2–4)
GLUCOSE SERPL-MCNC: 97 MG/DL (ref 65–100)
GLUCOSE UR STRIP.AUTO-MCNC: NEGATIVE MG/DL
HCG UR QL: NEGATIVE
HCT VFR BLD AUTO: 41.4 % (ref 35–47)
HGB BLD-MCNC: 13.6 G/DL (ref 11.5–16)
HGB UR QL STRIP: ABNORMAL
HYALINE CASTS URNS QL MICRO: ABNORMAL /LPF (ref 0–5)
IMM GRANULOCYTES # BLD AUTO: 0 K/UL (ref 0–0.04)
IMM GRANULOCYTES NFR BLD AUTO: 0 % (ref 0–0.5)
KETONES UR QL STRIP.AUTO: NEGATIVE MG/DL
LEUKOCYTE ESTERASE UR QL STRIP.AUTO: NEGATIVE
LIPASE SERPL-CCNC: 45 U/L (ref 13–75)
LYMPHOCYTES # BLD: 2.1 K/UL (ref 0.8–3.5)
LYMPHOCYTES NFR BLD: 32 % (ref 12–49)
MCH RBC QN AUTO: 29.6 PG (ref 26–34)
MCHC RBC AUTO-ENTMCNC: 32.9 G/DL (ref 30–36.5)
MCV RBC AUTO: 90 FL (ref 80–99)
MONOCYTES # BLD: 0.4 K/UL (ref 0–1)
MONOCYTES NFR BLD: 7 % (ref 5–13)
NEUTS SEG # BLD: 4 K/UL (ref 1.8–8)
NEUTS SEG NFR BLD: 58 % (ref 32–75)
NITRITE UR QL STRIP.AUTO: NEGATIVE
NRBC # BLD: 0 K/UL (ref 0–0.01)
NRBC BLD-RTO: 0 PER 100 WBC
PH UR STRIP: 6 (ref 5–8)
PLATELET # BLD AUTO: 256 K/UL (ref 150–400)
PMV BLD AUTO: 11 FL (ref 8.9–12.9)
POTASSIUM SERPL-SCNC: 3.8 MMOL/L (ref 3.5–5.1)
PROT SERPL-MCNC: 8.4 G/DL (ref 6.4–8.2)
PROT UR STRIP-MCNC: NEGATIVE MG/DL
RBC # BLD AUTO: 4.6 M/UL (ref 3.8–5.2)
RBC #/AREA URNS HPF: ABNORMAL /HPF (ref 0–5)
SODIUM SERPL-SCNC: 138 MMOL/L (ref 136–145)
SP GR UR REFRACTOMETRY: 1.03 (ref 1–1.03)
SPECIMEN HOLD: NORMAL
SPECIMEN HOLD: NORMAL
UROBILINOGEN UR QL STRIP.AUTO: 1 EU/DL (ref 0.2–1)
WBC # BLD AUTO: 6.8 K/UL (ref 3.6–11)
WBC URNS QL MICRO: ABNORMAL /HPF (ref 0–4)

## 2023-12-09 PROCEDURE — 83690 ASSAY OF LIPASE: CPT

## 2023-12-09 PROCEDURE — 81025 URINE PREGNANCY TEST: CPT

## 2023-12-09 PROCEDURE — 36415 COLL VENOUS BLD VENIPUNCTURE: CPT

## 2023-12-09 PROCEDURE — 85025 COMPLETE CBC W/AUTO DIFF WBC: CPT

## 2023-12-09 PROCEDURE — 96374 THER/PROPH/DIAG INJ IV PUSH: CPT

## 2023-12-09 PROCEDURE — 80053 COMPREHEN METABOLIC PANEL: CPT

## 2023-12-09 PROCEDURE — 6360000002 HC RX W HCPCS

## 2023-12-09 PROCEDURE — 2580000003 HC RX 258

## 2023-12-09 PROCEDURE — 74177 CT ABD & PELVIS W/CONTRAST: CPT

## 2023-12-09 PROCEDURE — 81001 URINALYSIS AUTO W/SCOPE: CPT

## 2023-12-09 PROCEDURE — 99285 EMERGENCY DEPT VISIT HI MDM: CPT

## 2023-12-09 PROCEDURE — 6360000004 HC RX CONTRAST MEDICATION: Performed by: RADIOLOGY

## 2023-12-09 RX ORDER — KETOROLAC TROMETHAMINE 30 MG/ML
30 INJECTION, SOLUTION INTRAMUSCULAR; INTRAVENOUS
Status: COMPLETED | OUTPATIENT
Start: 2023-12-09 | End: 2023-12-09

## 2023-12-09 RX ORDER — 0.9 % SODIUM CHLORIDE 0.9 %
1000 INTRAVENOUS SOLUTION INTRAVENOUS ONCE
Status: COMPLETED | OUTPATIENT
Start: 2023-12-09 | End: 2023-12-09

## 2023-12-09 RX ADMIN — SODIUM CHLORIDE 1000 ML: 9 INJECTION, SOLUTION INTRAVENOUS at 12:05

## 2023-12-09 RX ADMIN — IOPAMIDOL 100 ML: 755 INJECTION, SOLUTION INTRAVENOUS at 12:37

## 2023-12-09 RX ADMIN — KETOROLAC TROMETHAMINE 30 MG: 30 INJECTION, SOLUTION INTRAMUSCULAR; INTRAVENOUS at 12:05

## 2023-12-09 ASSESSMENT — ENCOUNTER SYMPTOMS
ABDOMINAL PAIN: 1
SHORTNESS OF BREATH: 0
DIARRHEA: 0
NAUSEA: 0
BACK PAIN: 1
VOMITING: 0

## 2023-12-09 ASSESSMENT — PAIN DESCRIPTION - LOCATION: LOCATION: FLANK;ABDOMEN

## 2023-12-09 ASSESSMENT — PAIN DESCRIPTION - DESCRIPTORS: DESCRIPTORS: ACHING;CRAMPING

## 2023-12-09 ASSESSMENT — PAIN SCALES - GENERAL
PAINLEVEL_OUTOF10: 8
PAINLEVEL_OUTOF10: 10

## 2023-12-09 ASSESSMENT — PAIN DESCRIPTION - ORIENTATION: ORIENTATION: RIGHT;LEFT;LOWER

## 2023-12-09 ASSESSMENT — PAIN DESCRIPTION - ONSET: ONSET: PROGRESSIVE

## 2023-12-09 ASSESSMENT — PAIN DESCRIPTION - FREQUENCY: FREQUENCY: CONTINUOUS

## 2023-12-09 ASSESSMENT — PAIN - FUNCTIONAL ASSESSMENT: PAIN_FUNCTIONAL_ASSESSMENT: PREVENTS OR INTERFERES SOME ACTIVE ACTIVITIES AND ADLS

## 2023-12-09 ASSESSMENT — PAIN DESCRIPTION - PAIN TYPE: TYPE: ACUTE PAIN

## 2023-12-09 NOTE — ED PROVIDER NOTES
[facility]ENC[facility]      Pt Name: Florentin Woodson  MRN: [id number]  [address] [facility] [place] 1980  Date of evaluation: 12/9/2023  Provider: BRIANNE Hooper NP    CHIEF COMPLAINT       Chief Complaint   Patient presents with    Abdominal Pain         HISTORY OF PRESENT ILLNESS   (Location/Symptom, Timing/Onset, Context/Setting, Quality, Duration, Modifying Factors, Severity)  Note limiting factors. Florentin Woodson is a 41 y.o. female presenting to the ED c/o  diffused abdominal pain and hematuria for 2 weeks. + generalized body aches. Pt reports just noticing vaginal bleeding when she is urinating and ambulating. + dysuria. + lower back pain. Pt reports pain feels similar to previous kidney stone pain. Pt reports changing her pad 2 times since 8 AM today. Denies pregnancy, diarrhea, nausea, vomiting, fevers, taking med for pain/ discomfort. Medical hx: kidney stones 2006  Surgical hx: denies  Social hx: denies smoking, drug, marijuana, etoh. The history is provided by the patient. The history is limited by a language barrier. A  was used. Review of External Medical Records:     Nursing Notes were reviewed. REVIEW OF SYSTEMS    (2-9 systems for level 4, 10 or more for level 5)     Review of Systems   Constitutional:  Negative for activity change, appetite change, chills and fever. Respiratory:  Negative for shortness of breath. Cardiovascular:  Negative for chest pain. Gastrointestinal:  Positive for abdominal pain. Negative for diarrhea, nausea and vomiting. Genitourinary:  Positive for dysuria, flank pain, hematuria and vaginal bleeding. Negative for decreased urine volume and difficulty urinating. Musculoskeletal:  Positive for back pain. Skin:  Negative for rash. All other systems reviewed and are negative. Except as noted above the remainder of the review of systems was reviewed and negative.        PAST ordered. Radiology: ordered. Risk  Prescription drug management. Patient is a 82-year-old female presenting to the emergency room complaining of diffuse lower abdominal pain, generalized bodyaches, and hematuria for the past 2 weeks. Doubt sepsis given patient is afebrile and white blood cell is 6.8. Doubt acute cystitis given UA was negative for nitrites, leukocyte esterase, elevated white blood cell, and bacteria. Doubt acute appendicitis, pyelonephritis, or kidney stones given CT of the abdomen showed \"no evidence of acute process in the abdomen or pelvis\". Patient was given a dose of Toradol while in the emergency room and patient reports improvement of symptoms after medication. Patient is encouraged to follow-up closely with her primary care provider for reevaluation in 3 to 4 days. Strict return to ED precautions given. ACI discussed with the patient; see instruction below. Patient verbalized understanding. CONSULTS:  None    PROCEDURES:  Unless otherwise noted below, none     Procedures      FINAL IMPRESSION      1.  Generalized abdominal pain          DISPOSITION/PLAN   DISPOSITION Decision To Discharge 12/09/2023 03:47:19 PM      PATIENT REFERRED TO:  Adela Moncada [address] [place] [address]  [phone]    Schedule an appointment as soon as possible for a visit in 3 days      primary care provider    Schedule an appointment as soon as possible for a visit in 3 days        DISCHARGE MEDICATIONS:  Discharge Medication List as of 12/9/2023  3:21 PM            (Please note that portions of this note were completed with a voice recognition program.  Efforts were made to edit the dictations but occasionally words are mis-transcribed.)    BRIANNE Gagnon NP (electronically signed)  Emergency Attending Physician / Physician Assistant / Nurse Practitioner             BRIANNE Gagnon NP  12/09/23 [id number]

## 2023-12-09 NOTE — ED TRIAGE NOTES
Pt arrived ambulatory to the ER with CC lower abdominal pain x2 weeks with hematuria/ vaginal bleeding (2 pads since waking at 0800) and bilateral flank pain. + Hx kidney stones. Pt denies N/V/D, fevers.

## 2023-12-09 NOTE — DISCHARGE INSTRUCTIONS
Take ibuprofen and tylenol as needed for pain. Take ibuprofen with food. Establish and follow-up with your primary care provider in 3-4 days for reevaluation. Call for appointment as soon as possible.

## 2023-12-09 NOTE — ED NOTES
Lisette Fu reviewed discharge instructions with the patient. The patient verbalized understanding. All questions and concerns were addressed. The patient declined a wheelchair and is discharged ambulatory in the care of family members with instructions and prescriptions in hand. Pt is alert and oriented x 4. Respirations are clear and unlabored.        Damien Damico RN  12/09/23 [id number]

## 2024-02-16 ENCOUNTER — APPOINTMENT (OUTPATIENT)
Facility: HOSPITAL | Age: 44
End: 2024-02-16
Payer: COMMERCIAL

## 2024-02-16 ENCOUNTER — HOSPITAL ENCOUNTER (EMERGENCY)
Facility: HOSPITAL | Age: 44
Discharge: HOME | End: 2024-02-16
Attending: STUDENT IN AN ORGANIZED HEALTH CARE EDUCATION/TRAINING PROGRAM
Payer: COMMERCIAL

## 2024-02-16 VITALS
SYSTOLIC BLOOD PRESSURE: 103 MMHG | OXYGEN SATURATION: 100 % | DIASTOLIC BLOOD PRESSURE: 60 MMHG | HEART RATE: 50 BPM | TEMPERATURE: 97.6 F | RESPIRATION RATE: 18 BRPM

## 2024-02-16 DIAGNOSIS — N30.01 ACUTE CYSTITIS WITH HEMATURIA: Primary | ICD-10-CM

## 2024-02-16 LAB
ALBUMIN SERPL-MCNC: 3.7 G/DL (ref 3.5–5)
ALBUMIN/GLOB SERPL: 0.9 (ref 1.1–2.2)
ALP SERPL-CCNC: 68 U/L (ref 45–117)
ALT SERPL-CCNC: 21 U/L (ref 12–78)
ANION GAP SERPL CALC-SCNC: 2 MMOL/L (ref 5–15)
APPEARANCE UR: ABNORMAL
AST SERPL-CCNC: 32 U/L (ref 15–37)
BACTERIA URNS QL MICRO: ABNORMAL /HPF
BASOPHILS # BLD: 0 K/UL (ref 0–0.1)
BASOPHILS NFR BLD: 0 % (ref 0–1)
BILIRUB SERPL-MCNC: 1 MG/DL (ref 0.2–1)
BILIRUB UR QL CFM: NEGATIVE
BUN SERPL-MCNC: 13 MG/DL (ref 6–20)
BUN/CREAT SERPL: 21 (ref 12–20)
CALCIUM SERPL-MCNC: 9.2 MG/DL (ref 8.5–10.1)
CAOX CRY URNS QL MICRO: ABNORMAL
CHLORIDE SERPL-SCNC: 112 MMOL/L (ref 97–108)
CO2 SERPL-SCNC: 26 MMOL/L (ref 21–32)
COLOR UR: ABNORMAL
COMMENT:: NORMAL
CREAT SERPL-MCNC: 0.62 MG/DL (ref 0.55–1.02)
DIFFERENTIAL METHOD BLD: ABNORMAL
EOSINOPHIL # BLD: 0.1 K/UL (ref 0–0.4)
EOSINOPHIL NFR BLD: 2 % (ref 0–7)
EPITH CASTS URNS QL MICRO: ABNORMAL /LPF
ERYTHROCYTE [DISTWIDTH] IN BLOOD BY AUTOMATED COUNT: 13.5 % (ref 11.5–14.5)
GLOBULIN SER CALC-MCNC: 3.9 G/DL (ref 2–4)
GLUCOSE SERPL-MCNC: 115 MG/DL (ref 65–100)
GLUCOSE UR STRIP.AUTO-MCNC: NEGATIVE MG/DL
HCG UR QL: NEGATIVE
HCT VFR BLD AUTO: 40.4 % (ref 35–47)
HGB BLD-MCNC: 13 G/DL (ref 11.5–16)
HGB UR QL STRIP: ABNORMAL
IMM GRANULOCYTES # BLD AUTO: 0 K/UL (ref 0–0.04)
IMM GRANULOCYTES NFR BLD AUTO: 1 % (ref 0–0.5)
KETONES UR QL STRIP.AUTO: NEGATIVE MG/DL
LEUKOCYTE ESTERASE UR QL STRIP.AUTO: ABNORMAL
LIPASE SERPL-CCNC: 39 U/L (ref 13–75)
LYMPHOCYTES # BLD: 1.9 K/UL (ref 0.8–3.5)
LYMPHOCYTES NFR BLD: 21 % (ref 12–49)
MCH RBC QN AUTO: 29.7 PG (ref 26–34)
MCHC RBC AUTO-ENTMCNC: 32.2 G/DL (ref 30–36.5)
MCV RBC AUTO: 92.2 FL (ref 80–99)
MONOCYTES # BLD: 0.5 K/UL (ref 0–1)
MONOCYTES NFR BLD: 5 % (ref 5–13)
MUCOUS THREADS URNS QL MICRO: ABNORMAL /LPF
NEUTS SEG # BLD: 6.3 K/UL (ref 1.8–8)
NEUTS SEG NFR BLD: 71 % (ref 32–75)
NITRITE UR QL STRIP.AUTO: NEGATIVE
NRBC # BLD: 0 K/UL (ref 0–0.01)
NRBC BLD-RTO: 0 PER 100 WBC
PH UR STRIP: 5.5 (ref 5–8)
PLATELET # BLD AUTO: 219 K/UL (ref 150–400)
PMV BLD AUTO: 11.6 FL (ref 8.9–12.9)
POTASSIUM SERPL-SCNC: 4.1 MMOL/L (ref 3.5–5.1)
PROT SERPL-MCNC: 7.6 G/DL (ref 6.4–8.2)
PROT UR STRIP-MCNC: 30 MG/DL
RBC # BLD AUTO: 4.38 M/UL (ref 3.8–5.2)
RBC #/AREA URNS HPF: ABNORMAL /HPF (ref 0–5)
SODIUM SERPL-SCNC: 140 MMOL/L (ref 136–145)
SP GR UR REFRACTOMETRY: 1.03 (ref 1–1.03)
SPECIMEN HOLD: NORMAL
SPECIMEN HOLD: NORMAL
UROBILINOGEN UR QL STRIP.AUTO: 0.2 EU/DL (ref 0.2–1)
WBC # BLD AUTO: 8.8 K/UL (ref 3.6–11)
WBC URNS QL MICRO: ABNORMAL /HPF (ref 0–4)

## 2024-02-16 PROCEDURE — 87086 URINE CULTURE/COLONY COUNT: CPT

## 2024-02-16 PROCEDURE — 36415 COLL VENOUS BLD VENIPUNCTURE: CPT

## 2024-02-16 PROCEDURE — 81025 URINE PREGNANCY TEST: CPT

## 2024-02-16 PROCEDURE — 85025 COMPLETE CBC W/AUTO DIFF WBC: CPT

## 2024-02-16 PROCEDURE — 99284 EMERGENCY DEPT VISIT MOD MDM: CPT

## 2024-02-16 PROCEDURE — 80053 COMPREHEN METABOLIC PANEL: CPT

## 2024-02-16 PROCEDURE — 81001 URINALYSIS AUTO W/SCOPE: CPT

## 2024-02-16 PROCEDURE — 87147 CULTURE TYPE IMMUNOLOGIC: CPT

## 2024-02-16 PROCEDURE — 83690 ASSAY OF LIPASE: CPT

## 2024-02-16 PROCEDURE — 74176 CT ABD & PELVIS W/O CONTRAST: CPT

## 2024-02-16 RX ORDER — NITROFURANTOIN 25; 75 MG/1; MG/1
100 CAPSULE ORAL 2 TIMES DAILY
Qty: 20 CAPSULE | Refills: 0 | Status: SHIPPED | OUTPATIENT
Start: 2024-02-16 | End: 2024-02-26

## 2024-02-16 NOTE — ED TRIAGE NOTES
Pt ambulatory to triage co abdominal pain since 2200 last night. Denies n/v. Endorses flank pain as well. Admits to frequent urination.

## 2024-02-16 NOTE — ED PROVIDER NOTES
[facility] EMERGENCY DEP  EMERGENCY DEPARTMENT ENCOUNTER      Pt Name: Nessa Georges  MRN: [id number]  Birthdate 1980  Date of evaluation: 2/16/2024  Provider: Alejandro Rivas MD    CHIEF COMPLAINT       Chief Complaint   Patient presents with    Abdominal Pain         HISTORY OF PRESENT ILLNESS   (Location/Symptom, Timing/Onset, Context/Setting, Quality, Duration, Modifying Factors, Severity)  Note limiting factors.   Patient is a 43-year-old female present emergency department complaining of abdominal pain.  Patient states that symptoms started around 11:00 last night states that his lower abdominal pain as well as bilateral flank pain.  Patient's had increased urination denies any fevers, chills nausea or vomiting.  Patient is otherwise healthy.  Patient denies any diarrhea or constipation.            Review of External Medical Records:     Nursing Notes were reviewed.    REVIEW OF SYSTEMS    (2-9 systems for level 4, 10 or more for level 5)     Review of Systems    Except as noted above the remainder of the review of systems was reviewed and negative.       PAST MEDICAL HISTORY   No past medical history on file.      SURGICAL HISTORY     No past surgical history on file.      CURRENT MEDICATIONS       Previous Medications    ASPIRIN 81 MG CHEWABLE TABLET    Take 81 mg by mouth daily    HYDROXYPROGESTERONE CAPROATE 250 MG/ML OIL OIL INJECTION    Inject 250 mg into the muscle every 7 days       ALLERGIES     Patient has no known allergies.    FAMILY HISTORY     No family history on file.       SOCIAL HISTORY       Social History     Socioeconomic History    Marital status: Single   Tobacco Use    Smoking status: Never    Smokeless tobacco: Never   Substance and Sexual Activity    Alcohol use: No           PHYSICAL EXAM    (up to 7 for level 4, 8 or more for level 5)     ED Triage Vitals [02/16/24 0720]   BP Temp Temp Source Pulse Respirations SpO2 Height Weight   103/60 97.6 °F (36.4 °C) Oral 50 18

## 2024-02-17 LAB
BACTERIA SPEC CULT: ABNORMAL
CC UR VC: ABNORMAL
SERVICE CMNT-IMP: ABNORMAL